# Patient Record
Sex: MALE | Race: BLACK OR AFRICAN AMERICAN | NOT HISPANIC OR LATINO | Employment: FULL TIME | URBAN - METROPOLITAN AREA
[De-identification: names, ages, dates, MRNs, and addresses within clinical notes are randomized per-mention and may not be internally consistent; named-entity substitution may affect disease eponyms.]

---

## 2019-09-12 DIAGNOSIS — M65.332 ACQUIRED TRIGGER FINGER OF LEFT MIDDLE FINGER: Primary | ICD-10-CM

## 2019-09-13 ENCOUNTER — CLINICAL SUPPORT (OUTPATIENT)
Dept: REHABILITATION | Facility: HOSPITAL | Age: 59
End: 2019-09-13
Payer: COMMERCIAL

## 2019-09-13 DIAGNOSIS — M25.649 DECREASED RANGE OF MOTION OF FINGER, UNSPECIFIED LATERALITY: ICD-10-CM

## 2019-09-13 PROCEDURE — 97165 OT EVAL LOW COMPLEX 30 MIN: CPT | Mod: PN

## 2019-09-13 PROCEDURE — 97110 THERAPEUTIC EXERCISES: CPT | Mod: PN

## 2019-09-13 NOTE — PLAN OF CARE
[facility] Occupational Therapy  Initial Evaluation     Date: 9/13/2019  Name: Inessa Peralta  Clinic Number: [id number]    Therapy Diagnosis:   1. Decreased range of motion of finger, unspecified laterality         Physician: Quita Cross MD    Physician Orders: Evaluate and Treat  Medical Diagnosis: M65.332 (ICD-10-CM) - Acquired trigger finger of left middle finger  Surgical Procedure and Date: 8/26/19  Evaluation Date: 9/13/19  Insurance Authorization Period Expiration: 09/11/2020  Plan of Care Certification Period: 9/13/19-12/5/19  Date of Return to MD: MASOOD    Visit # / Visits authorized: 1 / Pending  Time In:7:00 am  Time Out: 8:00 am  Total Billable Time: 60 minutes    Precautions:  Standard    Subjective     Involved Side: Left  Dominant Side: Left  Date of Onset: Patient reports surgery on   Mechanism of Injury:  Insidious   History of Current Condition: Mr. Peralta reports two injections without success along the left long finger 2' to triggering. He elected surgery on 9/2/19. Stitches were removed 9/10/19. He reports continued finger stiffness and arrives for therapy services.  Surgical Procedure: Trigger finger release 9/  Imaging: none   Previous Therapy: None    Patient's Goals for Therapy: To straighten finger    Pain:  Functional Pain Scale Rating 0-10:   none/10 on average  0/10 at best  none/10 at worst  Location: A1 pulley long finger left  Description: stiff  Aggravating Factors: Flexing  Easing Factors: ice    Occupation:    Working presently: employed  Duties: driving    Functional Limitations/Social History:    Previous functional status includes: Independent with all ADLs. I    Current FunctionalStatus   Home/Living environment : lives with their family      Limitation of Functional Status as follows:   ADLs/IADLs:     - Feeding: I    - Bathing: I    - Dressing/Grooming: I    - Driving: I     Leisure: Driving      Past Medical History/Physical  Systems Review:   Inessa Peralta  has no past medical history on file.    Inessa Peralta  has a past surgical history that includes Knee surgery.    [place] currently has no medications in their medication list.    Review of patient's allergies indicates:  No Known Allergies       Objective     Edema. Measured in centimeters.   9/13/2019      Left   Long:      P1 9.4 cm    PIP 9 cm    P2 8.7 cm    DIP WNL   P3 WNL           Hand ROM. Measured in degrees.  Date 9/13/2019      Left           Long:  MP 0/66              PIP -40/89              DIP 0/60            Strength (Dyanmometer) and Pinch Strength (Pinch Gauge)  Measured in pounds.  Date 9/13/2019      Left   Rung II 42         CMS Impairment/Limitation/Restriction for FOTO finger Survey    Therapist reviewed FOTO scores for Inessa Peralta on 9/13/2019.   FOTO documents entered into [employer] - see Media section.    Limitation Score: 30%  Category: Self Care         Treatment     Treatment Time In: 7:30 am  Treatment Time Out: 8:00 am   Total Treatment time separate from Evaluation time:30  [place] received therapeutic exercises to develop strength, endurance, ROM, flexibility, posture and core stabilization for 30 minutes including:  Five position tendon glide x 10   Pronation supination x 10  PROM into composite fist x 20 seconds x 4  Retrograde massage self x 5 min  Tenodesis wrist flexion/extensions + reverse tenodesis x 20 each  Intrinsic plus sponge compressions x 10  Palm w/b compressions x 10  Pt provided with recurve extension orthotic using scrap material to protmote static progressive finger extension      Home Exercise Program/Education:  Issued HEP (see patient instructions in EMR) and educated on modality use for pain management . Exercises were reviewed and [place] was able to demonstrate them prior to the end of the session.   Pt received a written copy of exercises to perform at home. [place] demonstrated good  understanding of the education  provided.  Pt was advised to perform these exercises free of pain, and to stop performing them if pain occurs.    Patient/Family Education: role of OT, goals for OT, scheduling/cancellations - pt verbalized understanding. Discussed insurance limitations with patient.    Additional Education provided: Pt instructed in use of finger extension splint    Assessment     Inessa Peralta is a 59 y.o. male referred to outpatient occupational therapy and presents with a medical diagnosis of finger extension splint, resulting in Decreased ROM, Decreased  strength, Decreased pinch strength, Increased pain, Edema and Joint Stiffness and demonstrates limitations as described in the chart below. Following medical record review it is determined that pt will benefit from occupational therapy services in order to maximize pain free and/or functional use of left trigger finger. The following goals were discussed with the patient and patient is in agreement with them as to be addressed in the treatment plan. The patient's rehab potential is Excellent.     Anticipated barriers to occupational therapy: None  Pt has no cultural, educational or language barriers to learning provided.    Profile and History Assessment of Occupational Performance Level of Clinical Decision Making Complexity Score   Occupational Profile:   Inessa Peralta is a 59 y.o. male who lives with their family and is currently employed. Inessa Peralta has difficulty with  feeding, bathing, grooming and dressing  finance management, driving/transportation management, phone/computer use, housework/household chores and medication management  affecting his/her daily functional abilities. His/her main goal for therapy is to straighten out finger.     Comorbidities:   none    Medical and Therapy History Review:   Brief               Performance Deficits    Physical:  Joint Mobility  Joint Stability  Muscle Power/Strength  Muscle Endurance  Skin Integrity/Scar  Formation  Edema   Strength  Visual Functions  Proprioception Functions  Tactile Functions  Muscle Tone  Postural Control  Pain    Cognitive:  No Deficits    Psychosocial:    No Deficits     Clinical Decision Making:  low    Assessment Process:  Problem-Focused Assessments    Modification/Need for Assistance:  Not Necessary    Intervention Selection:  Limited Treatment Options       low  Based on PMHX, co morbidities , data from assessments and functional level of assistance required with task and clinical presentation directly impacting function.       The following goals were discussed with the patient and patient is in agreement with them as to be addressed in the treatment plan.     Goals:     Goals to be met in 4 weeks: (10/10/19)  - Patient is independent with initial home exercise program to improve participation with ADL's.  - Pt to increase AROM of finger by 20 degrees extension to improve with patients ability to functional utilize hand for operating vehicle..  - Pt to increase  strength by 10 lbs to improve patients ability to operate machinery.  - Pt to decrease pain to less than or equal to 2/10 while performing all ADL's.  - Patient will be able to achieve less than or equal to 20% limitation on the FOTO, demonstrating overall improved functional ability with upper extremity.      Goals to be met by discharge:  - Patient is independent with advanced final home exercise program to improve participation with ADL's and IADL's.  - Pt to increase  strength to WNL to improve ability to carry heavy materials.  - Pt to increase finger extension to WNL as compared to RUE by d/c to improve patients ability to open tight jars  - Pt to report decrease in pain to less than or equal to 0/10 when performing ADL's.  - Patient will be able to achieve less than or equal to 10% limitation on the FOTO, demonstrating overall improved functional ability with upper extremity.         Plan   Certification  Period/Plan of care expiration: 9/13/2019 to 12/5/19.    Outpatient Occupational Therapy 2 times weekly for 12 weeks to include the following interventions: Paraffin, Fluidotherapy, Manual therapy/joint mobilizations, Modalities for pain management, US 3 mhz, Therapeutic exercises/activities., Iontophoresis with 2.0 cc Dexamethasone, Strengthening, Orthotic Fabrication/Fit/Training, Edema Control, Scar Management, Wound Care, Electrical Modalities, Joint Protection and Energy Conservation.      Lalo Kwong, OTR/L,CHT

## 2019-09-13 NOTE — PATIENT INSTRUCTIONS
"  TENDON GLIDES    Perform the following series of movements with your hand.     Start with an open palm and then bend your fingers to a claw hand as in the upper right image. Next, return to an open palm and then to an "L" hand" as shown in the bottom right image. Next, return to an open palm and then make a fist as in the bottom left image. Finally, return to an open palm and then repeat the series.     Wrist Tenodesis    Begin with your affected forearm supported on the edge of a table, desk, countertop, etc. Make sure to leave plenty of room for your wrist to move through the full available range of motion.    The first part of the motion is with the wrist relaxed in flexion and the digits relaxed in extension. Next, bring your wrist up into as much extension and your digits into as much of a fist as your pain/stiffness will allow. Repeat.        Sponge Intrinsic/Tripod Pinch    With the finger tips straight, pinch the sponge block.      Sponge Weight-bearing        prom for full flexion    using your other hand, bend all three of your knuckles into a fist as far as possible.  Hold for 10 seconds, repeat 5 times and repeat 3 times a day    Place affected palm on top of sponge, then push down bearing weight through hand.        Edema Control (Control of Swelling)- General Guidelines for the Hand    1) Keep affected hand elevated above heart level as much as possible.  2) Perform retrograde massage- apply hand lotion to elevated hand & massage the lotion from the finger tips down to the wrist. massage down only, for about 5 minutes, 3-5x per day  3) Raise hand over head & open/close fist 20 x every hour.  4) Apply ice 10-15 minutes 3x per day      Five position tendon glide x 10   Pronation supination x 10  PROM into composite fist x 20 seconds x 4  Retrograde massage self x 5 min  Tenodesis wrist flexion/extensions + reverse tenodesis x 20 each  Intrinsic plus sponge compressions x 10  Palm w/b compressions x " 10

## 2019-09-19 NOTE — PROGRESS NOTES
"  Occupational Therapy Daily Treatment Note     Date: 9/20/2019  Name: Inessa Peralta  Clinic Number: [id number]     Therapy Diagnosis:   1. Decreased range of motion of finger, unspecified laterality         Physician: Quita Cross MD     Physician Orders: Evaluate and Treat  Medical Diagnosis: M65.332 (ICD-10-CM) - Acquired trigger finger of left middle finger  Surgical Procedure and Date: 8/26/19  Evaluation Date: 9/13/19  Insurance Authorization Period Expiration: 09/11/2020  Plan of Care Certification Period: 9/13/19-12/5/19  Date of Return to MD: MASOOD     Visit # / Visits authorized: 2 / Pending  Time In: 7:40 am  Time Out: 8:20 am  Total Billable Time: 40 minutes     Precautions:  Standard      Subjective     Pt reports: "I leave the brace you gave me on a little longer and it helps it straighten out"   he was compliant with home exercise program given last session.   Response to previous treatment: Positive  Functional change: Pt improved ability to reach into pocket    Pain: 2/10  Location: left fingers      Objective     [place] received therapeutic exercises to develop strength, endurance, ROM, flexibility and posture for 22 minutes including:  Five position tendon glide x 10   Pronation supination x 10  Tenodesis wrist flexion/extensions + reverse tenodesis x 20 each  Intrinsic scissoring of orange putty x 2 min  Palm w/b compressions of orange putty   Expanding rubber band on cup x 20  Gross grasp medium orange putty x 3 min  Removal of hidden objects in medium putty x 2 min  Long finger extension through medium putty x 2 min    [place] received the following manual therapy techniques: applied to the: long finger for 10 minutes, including:  Gentle PROM stretching into extension  Gentle joint mobilizations   Scar mobilization using vibratory tool    [place] received the following direct contact modalities after being cleared for contraindications:Patient received ultrasound to base of long finger " and PIP joint of long finger area to increase blood flow, circulation, tissue elasticity, pain management and for wound/scar management for 8 minutes @ 3.3 Mhz, Intensity .08 w/cm2 at 100% duty cycle. .    [place] received the following supervised modalities after being cleared for contradictions: Patient received paraffin bath to left hand(s) for 5 minutes to increase blood flow, circulation, pain management and for tissue elasticity prior to therex.           Home Exercises and Education Provided     Education provided:   - Pt instructed in self stretching protocol  - Progress towards goals   -Pt provided with recurve extension orthotic using scrap material to protmote static progressive finger extension  Written Home Exercises Provided: Patient instructed to cont prior HEP.  Exercises were reviewed and [place] was able to demonstrate them prior to the end of the session.  [place] demonstrated good  understanding of the HEP provided.   .   See EMR under Patient Instructions for exercises provided prior visit.        Assessment     Mr. Peralta returns to therapy presenting with continued finger contracture of PIP joint of small finger. He appears to be responding very well to joint mobilization and therex. Patient has strength deficits which stands to benefit form continued therapy. Scar mobility remains poor.     [place] is progressing well towards his goals and there are no updates to goals at this time. Pt prognosis is Excellent.     Pt will continue to benefit from skilled outpatient occupational therapy to address the deficits listed in the problem list on initial evaluation provide pt/family education and to maximize pt's level of independence in the home and community environment.     Anticipated barriers to occupational therapy: None    Pt's spiritual, cultural and educational needs considered and pt agreeable to plan of care and goals.    Goals:  Goals to be met in 4 weeks: (10/10/19)  - Patient is  independent with initial home exercise program to improve participation with ADL's. NOT MET   - Pt to increase AROM of finger by 20 degrees extension to improve with patients ability to functional utilize hand for operating vehicle. NOT MET   - Pt to increase  strength by 10 lbs to improve patients ability to operate machinery. NOT MET   - Pt to decrease pain to less than or equal to 2/10 while performing all ADL's. NOT MET   - Patient will be able to achieve less than or equal to 20% limitation on the FOTO, demonstrating overall improved functional ability with upper extremity. NOT MET      Goals to be met by discharge:  - Patient is independent with advanced final home exercise program to improve participation with ADL's and IADL's. NOT MET   - Pt to increase  strength to WNL to improve ability to carry heavy materials. NOT MET   - Pt to increase finger extension to WNL as compared to RUE by d/c to improve patients ability to open tight jars  - Pt to report decrease in pain to less than or equal to 0/10 when performing ADL's. NOT MET   - Patient will be able to achieve less than or equal to 10% limitation on the FOTO, demonstrating overall improved functional ability with upper extremity. NOT MET       Plan   Continue with occupational therapy POC to progress patient to improved participation with ADL's and IADL's.   Updates/Grading for next session: Continue to progress ROM, Strength, pain and restore patient to PLOF with ADL's and IADL's.      Lalo Kwong, OTR/L,CHT

## 2019-09-20 ENCOUNTER — CLINICAL SUPPORT (OUTPATIENT)
Dept: REHABILITATION | Facility: HOSPITAL | Age: 59
End: 2019-09-20
Payer: COMMERCIAL

## 2019-09-20 DIAGNOSIS — M65.332 ACQUIRED TRIGGER FINGER OF LEFT MIDDLE FINGER: Primary | ICD-10-CM

## 2019-09-20 DIAGNOSIS — M25.649 DECREASED RANGE OF MOTION OF FINGER, UNSPECIFIED LATERALITY: ICD-10-CM

## 2019-09-20 PROCEDURE — 97140 MANUAL THERAPY 1/> REGIONS: CPT | Mod: PN

## 2019-09-20 PROCEDURE — 97018 PARAFFIN BATH THERAPY: CPT | Mod: PN,59

## 2019-09-20 PROCEDURE — 97110 THERAPEUTIC EXERCISES: CPT | Mod: PN

## 2019-09-20 PROCEDURE — 97035 APP MDLTY 1+ULTRASOUND EA 15: CPT | Mod: PN

## 2019-09-26 NOTE — PROGRESS NOTES
"  Occupational Therapy Daily Treatment Note     Date: 9/27/2019  Name: Inessa Peralta  [facility] Number: [id number]     Therapy Diagnosis:   1. Decreased range of motion of finger, unspecified laterality         Physician: Quita Cross MD     Physician Orders: Evaluate and Treat  Medical Diagnosis: M65.332 (ICD-10-CM) - Acquired trigger finger of left middle finger  Surgical Procedure and Date: 8/26/19  Evaluation Date: 9/13/19  Insurance Authorization Period Expiration: 09/11/2020  Plan of Care Certification Period: 9/13/19-12/5/19  Date of Return to MD: MASOOD     Visit # / Visits authorized: 2 / Pending  Time In: 3:30 pm  Time Out: 4:00:pm  Total Billable Time: 40 minutes     Precautions:  Standard      Subjective     Pt reports: "It gets straight then when I wake up its bend again"   he was compliant with home exercise program given last session.   Response to previous treatment: Positive  Functional change: Pt improved ability to reach into pocket    Pain: 2/10  Location: left fingers      Objective     [place] received therapeutic exercises to develop strength, endurance, ROM, flexibility and posture for 22 minutes including:  Five position tendon glide x 10   Pronation supination x 10  Tenodesis wrist flexion/extensions + reverse tenodesis x 20 each  Intrinsic scissoring of orange putty x 2 min  Palm w/b compressions of orange putty   Expanding rubber band on cup x 20  Gross grasp medium orange putty x 3 min  Removal of hidden objects in medium putty x 2 min  Long finger extension through medium putty x 2 min  Finger expansion of orange putty x 2 min  Gross grasp x 2 min orange putty   [place] received the following manual therapy techniques: applied to the: long finger for 10 minutes, including:  Gentle PROM stretching into extension  Gentle joint mobilizations   Scar mobilization using vibratory tool    [place] received the following direct contact modalities after being cleared for " contraindications:Patient received ultrasound to base of long finger and PIP joint of long finger area to increase blood flow, circulation, tissue elasticity, pain management and for wound/scar management for 8 minutes @ 3.3 Mhz, Intensity .08 w/cm2 at 100% duty cycle. .    [place] received the following supervised modalities after being cleared for contradictions: Patient received paraffin bath to left hand(s) for 5 minutes to increase blood flow, circulation, pain management and for tissue elasticity prior to therex.           Home Exercises and Education Provided     Education provided:   - Pt instructed in self stretching protocol  - Progress towards goals   -Pt provided with recurve extension orthotic using scrap material to protmote static progressive finger extension  Written Home Exercises Provided: Patient instructed to cont prior HEP.  Exercises were reviewed and [place] was able to demonstrate them prior to the end of the session.  [place] demonstrated good  understanding of the HEP provided.   .   See EMR under Patient Instructions for exercises provided prior visit.        Assessment     Mr. Peralta continues to have a joint stiffness.  strength shows improvement. Pt provided with yellow theraputty and coban wrapping to address continued edema. Mr. Peralta remains compliant with self PROM program and continues to benefit from therapy at this time.     [place] is progressing well towards his goals and there are no updates to goals at this time. Pt prognosis is Excellent.     Pt will continue to benefit from skilled outpatient occupational therapy to address the deficits listed in the problem list on initial evaluation provide pt/family education and to maximize pt's level of independence in the home and community environment.     Anticipated barriers to occupational therapy: None    Pt's spiritual, cultural and educational needs considered and pt agreeable to plan of care and goals.    Goals:  Goals  to be met in 4 weeks: (10/10/19)  - Patient is independent with initial home exercise program to improve participation with ADL's. NOT MET   - Pt to increase AROM of finger by 20 degrees extension to improve with patients ability to functional utilize hand for operating vehicle. NOT MET   - Pt to increase  strength by 10 lbs to improve patients ability to operate machinery. NOT MET   - Pt to decrease pain to less than or equal to 2/10 while performing all ADL's. NOT MET   - Patient will be able to achieve less than or equal to 20% limitation on the FOTO, demonstrating overall improved functional ability with upper extremity. NOT MET      Goals to be met by discharge:  - Patient is independent with advanced final home exercise program to improve participation with ADL's and IADL's. NOT MET   - Pt to increase  strength to WNL to improve ability to carry heavy materials. NOT MET   - Pt to increase finger extension to WNL as compared to RUE by d/c to improve patients ability to open tight jars  - Pt to report decrease in pain to less than or equal to 0/10 when performing ADL's. NOT MET   - Patient will be able to achieve less than or equal to 10% limitation on the FOTO, demonstrating overall improved functional ability with upper extremity. NOT MET       Plan   Continue with occupational therapy POC to progress patient to improved participation with ADL's and IADL's.   Updates/Grading for next session: Continue to progress ROM, Strength, pain and restore patient to PLOF with ADL's and IADL's.      Lalo Kwong, OTR/L,CHT

## 2019-09-27 ENCOUNTER — CLINICAL SUPPORT (OUTPATIENT)
Dept: REHABILITATION | Facility: HOSPITAL | Age: 59
End: 2019-09-27
Payer: COMMERCIAL

## 2019-09-27 DIAGNOSIS — M25.649 DECREASED RANGE OF MOTION OF FINGER, UNSPECIFIED LATERALITY: ICD-10-CM

## 2019-09-27 PROCEDURE — 97110 THERAPEUTIC EXERCISES: CPT | Mod: PN

## 2019-09-27 PROCEDURE — 97140 MANUAL THERAPY 1/> REGIONS: CPT | Mod: PN

## 2019-09-27 PROCEDURE — 97035 APP MDLTY 1+ULTRASOUND EA 15: CPT | Mod: PN

## 2019-10-11 ENCOUNTER — CLINICAL SUPPORT (OUTPATIENT)
Dept: REHABILITATION | Facility: HOSPITAL | Age: 59
End: 2019-10-11
Payer: COMMERCIAL

## 2019-10-11 DIAGNOSIS — M25.642 DECREASED RANGE OF MOTION OF FINGER OF LEFT HAND: ICD-10-CM

## 2019-10-11 PROCEDURE — 97110 THERAPEUTIC EXERCISES: CPT | Mod: PN

## 2019-10-11 PROCEDURE — 97018 PARAFFIN BATH THERAPY: CPT | Mod: PN

## 2019-10-11 NOTE — PATIENT INSTRUCTIONS
Edema Reduction (Contrast Baths)        Have 2 containers deep enough for involved area to be immersed. Fill one with warm water and the other with slightly chilled water.  Soak in warm water for 2 minutes; cold for  1 minute.  Alternate and continue for 9 minutes. End in warm water.    Copyright © I. All rights reserved.   PIP Extension (Passive        Use thumb of other hand on top of joint and two fingers under- neath on either side to straighten middle joint of long finger. Hold __10__ seconds.  Then bend finger hold x 5 seconds and repeat  Repeat _10___ times. Do __3__ sessions per day.    Copyright © [employer]. All rights reserved.

## 2019-10-11 NOTE — PROGRESS NOTES
"  Occupational Therapy Daily Treatment Note     Date: 10/11/2019  Name: Inessa Peralta  [facility] Number: [id number]     Therapy Diagnosis:   1. Decreased range of motion of finger of left hand       Physician: Quita Cross MD     Physician Orders: Evaluate and Treat  Medical Diagnosis: M65.332 (ICD-10-CM) - Acquired trigger finger of left middle finger  Surgical Procedure and Date: 8/26/19  Evaluation Date: 9/13/19  Insurance Authorization Period Expiration: 09/11/2020  Plan of Care Certification Period: 9/13/19-12/5/19  Date of Return to MD: MASOOD     Visit # / Visits authorized: 4 / 10  Time In: 7:45 Am  Time Out: 8:25  Am  Total Billable Time:  40 minutes     Precautions:  Standard      Subjective     Pt reports: "It just won't get straight."   he was compliant with home exercise program given last session.   Response to previous treatment: Positive  Functional change: Pt improved ability to reach into pocket    Pain:  0/10 at rest 5-6/10 with fist   Location: left fingers      Objective     Re-Assess:    Edema. Measured in centimeters.    9/13/2019    10/11/10     Left Left    Long:        P1 9.4 cm 8.7    PIP 9 cm  8.8   P2 8.7 cm 8.4    DIP WNL    P3 WNL       Hand ROM. Measured in degrees.  Date 9/13/2019    10/11/19     Left left   Long:  MP 0/66 0/73              PIP -40/89 -47/86              DIP 0/60 0/58   Post therapy PIP extension 38*      Strength (Dyanmometer) and Pinch Strength (Pinch Gauge)  Measured in pounds.  Date 9/13/2019    10/11/19     Left Left    Rung II 42 52     [place] received the following direct contact modalities after being cleared for contraindications:    Patient received paraffin bath to left hand(s) for 5 minutes to increase blood flow, circulation, pain management and for tissue elasticity prior to therex.     [place] received therapeutic exercises to develop strength, endurance, ROM, flexibility and posture for 35 minutes including:  -left long finger PIP joint " mobilization for extended time  -passive stretch of long finger into Flexion and extension  -isolated active flexion and extension     Home Exercises and Education Provided     Education provided:   - additional  Stretching and contrast bath instructions provided.  -pt provided with coban to apply his finger splint   -pt provided with digi sleeve to assist with swelling of his long finger  - Progress towards goals   -Pt provided with recurve extension orthotic using scrap material to protmote static progressive finger extension  Written Home Exercises Provided: Patient instructed to cont prior HEP.  Exercises were reviewed and [place] was able to demonstrate them prior to the end of the session.  [place] demonstrated good  understanding of the HEP provided.   .   See EMR under Patient Instructions for exercises provided prior visit.        Assessment     Mr. Peralta continues to have a joint stiffness, improvement in edema noted above although decreases in AROM noted as well  Pt demonstrated improvement in finger extension of PIP upon conclusion of therapy.  [place] is progressing well towards his goals and there are no updates to goals at this time. Pt prognosis is Excellent.     Pt will continue to benefit from skilled outpatient occupational therapy to address the deficits listed in the problem list on initial evaluation provide pt/family education and to maximize pt's level of independence in the home and community environment.     Anticipated barriers to occupational therapy: None    Pt's spiritual, cultural and educational needs considered and pt agreeable to plan of care and goals.    Goals:  Goals to be met in 4 weeks: (10/10/19)  - Patient is independent with initial home exercise program to improve participation with ADL's. In progress-  - Pt to increase AROM of finger by 20 degrees extension to improve with patients ability to functional utilize hand for operating vehicle. NOT MET   - Pt to increase   strength by 10 lbs to improve patients ability to operate machinery.met 10/11/19  - Pt to decrease pain to less than or equal to 2/10 while performing all ADL's. NOT MET   - Patient will be able to achieve less than or equal to 20% limitation on the FOTO, demonstrating overall improved functional ability with upper extremity. Not assessed      Goals to be met by discharge:  - Patient is independent with advanced final home exercise program to improve participation with ADL's and IADL's. NOT MET   - Pt to increase  strength to WNL to improve ability to carry heavy materials. NOT MET   - Pt to increase finger extension to WNL as compared to RUE by d/c to improve patients ability to open tight jars  - Pt to report decrease in pain to less than or equal to 0/10 when performing ADL's. NOT MET   - Patient will be able to achieve less than or equal to 10% limitation on the FOTO, demonstrating overall improved functional ability with upper extremity. NOT MET       Plan   Continue with occupational therapy POC to progress patient to improved participation with ADL's and IADL's.   Updates/Grading for next session: Continue to progress ROM, Strength, pain and restore patient to PLOF with ADL's and IADL's.      JOSH Lubin

## 2019-10-17 ENCOUNTER — CLINICAL SUPPORT (OUTPATIENT)
Dept: REHABILITATION | Facility: HOSPITAL | Age: 59
End: 2019-10-17
Payer: COMMERCIAL

## 2019-10-17 DIAGNOSIS — M25.642 DECREASED RANGE OF MOTION OF FINGER OF LEFT HAND: ICD-10-CM

## 2019-10-17 PROCEDURE — 97110 THERAPEUTIC EXERCISES: CPT | Mod: PN

## 2019-10-17 NOTE — PROGRESS NOTES
"  Occupational Therapy Daily Treatment Note     Date: 10/17/2019  Name: Inessa Peralta  [facility] Number: [id number]     Therapy Diagnosis:   1. Decreased range of motion of finger of left hand         Physician: Quita Cross MD     Physician Orders: Evaluate and Treat  Medical Diagnosis: M65.332 (ICD-10-CM) - Acquired trigger finger of left middle finger  Surgical Procedure and Date: 8/26/19  Evaluation Date: 9/13/19  Insurance Authorization Period Expiration: 09/11/2020  Plan of Care Certification Period: 9/13/19-12/5/19  Date of Return to MD: MASOOD     Visit # / Visits authorized: 5 / 10  Time In: 11:00 Am  Time Out: 11:30  Am  Total Billable Time:  40 minutes     Precautions:  Standard      Subjective     Pt reports: "It felt better with the things that Mrs. Rodriguez did. I want to keep doing that. I can only keep my brace on for 5 minutes" Pt cautioned against over tightening static extension splint and he should be able to comfortably elvira. For 15-20 minutes   he was compliant with home exercise program given last session.   Response to previous treatment: Positive  Functional change: Pt improved ability to reach into pocket    Pain:  0/10 at rest 5-6/10 with fist   Location: left fingers      Objective       [place] received the following direct contact modalities after being cleared for contraindications:    Patient received paraffin bath to left hand(s) for 5 minutes to increase blood flow, circulation, pain management and for tissue elasticity prior to therex.     [place] received therapeutic exercises to develop strength, endurance, ROM, flexibility and posture for 30 minutes including:  -left long finger PIP joint mobilization for extended time  -passive stretch of long finger into Flexion and extension  -isolated active flexion and extension     Home Exercises and Education Provided     Education provided:   - additional  Stretching and contrast bath instructions provided.  -pt provided with coban to apply " his finger splint   -pt provided with digi sleeve to assist with swelling of his long finger  - Progress towards goals   -Pt provided with recurve extension orthotic using scrap material to protmote static progressive finger extension  Written Home Exercises Provided: Patient instructed to cont prior HEP.  Exercises were reviewed and [place] was able to demonstrate them prior to the end of the session.  [place] demonstrated good  understanding of the HEP provided.   .   See EMR under Patient Instructions for exercises provided prior visit.        Assessment     Treatment strategy adjusted to parallel vist on 10/11/19. Patient arrived with -30 degree finger extension and was able to achieve -19 following session. He reports satisfaction with low long progressive stretching.     [place] is progressing well towards his goals and there are no updates to goals at this time. Pt prognosis is Excellent.     Pt will continue to benefit from skilled outpatient occupational therapy to address the deficits listed in the problem list on initial evaluation provide pt/family education and to maximize pt's level of independence in the home and community environment.     Anticipated barriers to occupational therapy: None    Pt's spiritual, cultural and educational needs considered and pt agreeable to plan of care and goals.    Goals:  Goals to be met in 4 weeks: (10/10/19)  - Patient is independent with initial home exercise program to improve participation with ADL's. In progress-  - Pt to increase AROM of finger by 20 degrees extension to improve with patients ability to functional utilize hand for operating vehicle. NOT MET   - Pt to increase  strength by 10 lbs to improve patients ability to operate machinery.met 10/11/19  - Pt to decrease pain to less than or equal to 2/10 while performing all ADL's. NOT MET   - Patient will be able to achieve less than or equal to 20% limitation on the FOTO, demonstrating overall  improved functional ability with upper extremity. Not assessed      Goals to be met by discharge:  - Patient is independent with advanced final home exercise program to improve participation with ADL's and IADL's. NOT MET   - Pt to increase  strength to WNL to improve ability to carry heavy materials. NOT MET   - Pt to increase finger extension to WNL as compared to RUE by d/c to improve patients ability to open tight jars  - Pt to report decrease in pain to less than or equal to 0/10 when performing ADL's. NOT MET   - Patient will be able to achieve less than or equal to 10% limitation on the FOTO, demonstrating overall improved functional ability with upper extremity. NOT MET       Plan   Continue with occupational therapy POC to progress patient to improved participation with ADL's and IADL's.   Updates/Grading for next session: Continue to progress ROM, Strength, pain and restore patient to PLOF with ADL's and IADL's.      Lalo Kwong, OTR/L,CHT , LOTR

## 2019-10-18 DIAGNOSIS — M24.542 FLEXION CONTRACTURE OF JOINT OF LEFT HAND: Primary | ICD-10-CM

## 2019-10-24 NOTE — PROGRESS NOTES
Occupational Therapy Daily Treatment Note     Date: 10/25/2019  Name: Inessa Peralta  Clinic Number: [id number]     Therapy Diagnosis:   1. Decreased range of motion of finger of left hand         Physician: Quita Cross MD     Physician Orders: Evaluate and Treat  Medical Diagnosis: M65.332 (ICD-10-CM) - Acquired trigger finger of left middle finger  Surgical Procedure and Date: 8/26/19  Evaluation Date: 9/13/19  Insurance Authorization Period Expiration: 09/11/2020  Plan of Care Certification Period: 9/13/19-12/5/19  Date of Return to MD: MASOOD     Visit # / Visits authorized: 6 / 10  Time In: 8:30 am  Time Out:  9:05  m  Total Billable Time:  35 minutes     Precautions:  Standard      Subjective     Pt reports:  It really is not feeling any better.   he was compliant with home exercise program given last session.   Response to previous treatment: sore  Functional change: none reported    Pain:  0/10 at rest 7/10 with fist   Location: left fingers      Objective       [place] received the following direct contact modalities after being cleared for contraindications:    Patient received fluidotherapy to left hand(s) for 8 minutes to increase blood flow, circulation, desensitization, sensory re-education and for pain management.    [place] received therapeutic exercises to develop strength, endurance, ROM, flexibility and posture for 27 minutes including:  -left long finger PIP joint mobilization for extended time  -passive stretch of long finger into extension and to lesser extent flexion   -yellow rubber band resisted long finger extension x 2 min  -extension of left long finger from table top x 10 reps  -abd/adduction of long finger x 10 reps   -isolated active flexion and extension     Left long finger PIP ext 27 flex 97 dip flex 37    Home Exercises and Education Provided     Education provided:   -- Progress towards goals   -pt instructed to stretch and flex every hour to assist in his  recovery.    Written Home Exercises Provided: Patient instructed to cont prior HEP.  Exercises were reviewed and [place] was able to demonstrate them prior to the end of the session.  [place] demonstrated good  understanding of the HEP provided.   .   See EMR under Patient Instructions for exercises provided 10/11/19 and 9/13/19       Assessment     Treatment strategy adjusted to parallel vist on 10/11/19. Pt with improvement in AROM upon conclusion of therapy session.      [place] is progressing well towards his goals and there are no updates to goals at this time. Pt prognosis is Excellent.     Pt will continue to benefit from skilled outpatient occupational therapy to address the deficits listed in the problem list on initial evaluation provide pt/family education and to maximize pt's level of independence in the home and community environment.     Anticipated barriers to occupational therapy: None    Pt's spiritual, cultural and educational needs considered and pt agreeable to plan of care and goals.    Goals:  Goals to be met in 4 weeks: (10/10/19)  - Patient is independent with initial home exercise program to improve participation with ADL's. In progress-  - Pt to increase AROM of finger by 20 degrees extension to improve with patients ability to functional utilize hand for operating vehicle. NOT MET   - Pt to increase  strength by 10 lbs to improve patients ability to operate machinery.met 10/11/19  - Pt to decrease pain to less than or equal to 2/10 while performing all ADL's. NOT MET   - Patient will be able to achieve less than or equal to 20% limitation on the FOTO, demonstrating overall improved functional ability with upper extremity. Not assessed      Goals to be met by discharge:  - Patient is independent with advanced final home exercise program to improve participation with ADL's and IADL's. NOT MET   - Pt to increase  strength to WNL to improve ability to carry heavy materials. NOT MET    - Pt to increase finger extension to WNL as compared to RUE by d/c to improve patients ability to open tight jars  - Pt to report decrease in pain to less than or equal to 0/10 when performing ADL's. NOT MET   - Patient will be able to achieve less than or equal to 10% limitation on the FOTO, demonstrating overall improved functional ability with upper extremity. NOT MET       Plan   Continue with occupational therapy POC to progress patient to improved participation with ADL's and IADL's.   Updates/Grading for next session: Continue to progress ROM, Strength, pain and restore patient to PLOF with ADL's and IADL's.      JOSH Lubin

## 2019-10-25 ENCOUNTER — CLINICAL SUPPORT (OUTPATIENT)
Dept: REHABILITATION | Facility: HOSPITAL | Age: 59
End: 2019-10-25
Payer: COMMERCIAL

## 2019-10-25 DIAGNOSIS — M25.642 DECREASED RANGE OF MOTION OF FINGER OF LEFT HAND: ICD-10-CM

## 2019-10-25 PROCEDURE — 97022 WHIRLPOOL THERAPY: CPT | Mod: PN

## 2019-10-25 PROCEDURE — 97110 THERAPEUTIC EXERCISES: CPT | Mod: PN

## 2019-12-06 DIAGNOSIS — M65.332 ACQUIRED TRIGGER FINGER OF LEFT MIDDLE FINGER: Primary | ICD-10-CM

## 2019-12-06 DIAGNOSIS — M24.542 FLEXION CONTRACTURE OF JOINT OF LEFT HAND: ICD-10-CM

## 2019-12-20 ENCOUNTER — CLINICAL SUPPORT (OUTPATIENT)
Dept: REHABILITATION | Facility: HOSPITAL | Age: 59
End: 2019-12-20
Payer: COMMERCIAL

## 2019-12-20 DIAGNOSIS — M25.642 FINGER STIFFNESS, LEFT: ICD-10-CM

## 2019-12-20 PROCEDURE — 97165 OT EVAL LOW COMPLEX 30 MIN: CPT | Mod: PO

## 2019-12-20 PROCEDURE — L3933 FO W/O JOINTS CF: HCPCS | Mod: PO

## 2019-12-20 NOTE — PLAN OF CARE
[facility] Occupational Therapy  Initial Evaluation     Date: 12/20/2019  Patient: Inesas Peralta  Chart Number: [id number]  Referring Physician: Quita Cross MD  Therapy Diagnosis:   1. Finger stiffness, left         Medical Diagnosis: Left LF PIP contracture  Physician Orders: Eval/tx, splinting  Evaluation Date: 12/20/2019  Authorization Period: 12/31/2019  Surgery Date and Procedure: 11/26/2019  Date of Return to MD: TBE    Visit #: 1 of 20  Time In: 4:00 PM  Time Out: 4:50 PM    Precautions: Standard     Subjective     Involved Side: Left  Dominant Side: Right  Date of Onset: 11/26/2019  History of Current Condition: Pt s/p left LF PIP capsulotomy 2/2 failed trigger finger release and subsequent PIP contracture.    Patient's Goals for Therapy: Use left hand for normal work duties.    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    Inessa Peralta  has a past surgical history that includes Knee surgery.    [place] currently has no medications in their medication list.    Review of patient's allergies indicates:  No Known Allergies       Objective     Mental status: alert    Observation:   Skin intact    Sensation:   WNL      Wound Assessment:   Closed    Edema: Circumferential measurements: In centimters     12/20/2019 12/20/2019    Left Right   Index:       P1      PIP     P2      DIP     P3     Long:       P1 9.0 8.0    PIP 9.4 8.7   P2 8.7 7.5    DIP     P3     Ring:       P1                 PIP                P2                  DIP     P3     Small:        P1                 PIP            P2             DIP     P3     Thumb:     Prox. Phalanx      IP     Distal Phalanx       Range of Motion:   Left    Finger AROM   LONG    MP  +17/70   PIP  40/90   DIP  0/45        Strength: (DHRUV Dynamometer in psi.)      12/20/2019 12/20/2019    Left Right   Rung II 50 111       Pinch Strength (Measured in psi)     12/20/2019 12/20/2019    Left Right   3pt Pinch 13  21        Treatment     [place] received the following orthotic fabrication:   -Finger gutter     Home Exercise Program/Education:  Issued HEP (see patient instructions in EMR) and educated on modality use for pain management . Exercises were reviewed and [place] was able to demonstrate them prior to the end of the session.   Pt received a written copy of exercises to perform at home. [place] demonstrated good  understanding of the education provided.  Pt was advised to perform these exercises free of pain, and to stop performing them if pain occurs.    Patient/Family Education: role of OT, goals for OT, scheduling/cancellations - pt verbalized understanding. Discussed insurance limitations with patient.    Additional Education provided: Wear/care schedule, retrograde massage      Assessment     Inessa Peralta is a 59 y.o. male presents with limitations as described in problem list. Patient can benefit from Occupational Therapy services for Iontophoresis, ultrasound, moist heat, therapeutic exercises, home exercise program provied with written instructions, ice and strengthening and orthotics, if deemed necessary . The following goals were discussed with the patient and she is in agreement with them as to be addressed in the treatment plan.    The patient's rehab potential is Good.     Anticipated barriers to occupational therapy: Long-standing nature of contracture  Pt has no cultural, educational or language barriers to learning provided.    Profile and History Assessment of Occupational Performance Level of Clinical Decision Making Complexity Score   Occupational Profile:   Inessa Peralta is a  59 y.o. male who is currently employed [place] [employer] has difficulty with  ADLs and IADLs as listed previously, which  affecting his/her daily functional abilities.      Comorbidities:    has no past medical history on file.    Medical and Therapy History Review:   Brief               Performance Deficits    Physical:  Joint Mobility  Muscle Power/Strength   Strength  Pinch Strength  Gross Motor Coordination  Fine Motor Coordination  Pain    Cognitive:  No Deficits    Psychosocial:    No Deficits     Clinical Decision Making:  low    Assessment Process:  Problem-Focused Assessments    Modification/Need for Assistance:  Not Necessary    Intervention Selection:  Multiple Treatment Options       low  Based on PMHX, co morbidities , data from assessments and functional level of assistance required with task and clinical presentation directly impacting function.         Goals:    LTG's (8 weeks):  1)   Increase ROM 25 degrees in Middle finger PIP joint dorsal aspect to increase functional hand use for preparation to grasp large objects.  2)   Increase  strength 50 lbs. to grasp chain.  3)   Increase 3J pinch 6 psis for grasping boot straps.  4)   Decrease complaints of pain to  2 out of 10 at worst to increase functional hand use for ADL/work/leisure activities.  5)   Pt will return to near to prior level of function for ADLs and household management reporting I or Mod I with ADLs (dressing, feeding, grooming, toileting).     STG's (4 weeks)  1)   Patient to be IND with HEP and modalities for pain/edema managment.  2)   Increase PIP flex ROM 10 degrees to increase functional hand use for ADLs/work/leisure activities.  3)   Increase  strength 20 lbs. to improve functional grasp for ADLs/work/leisure activities.   4)   Increase 3J pinch 3 psi's to increase independence with button and FM Coordination.   5)   Patient to be IND wiht Orthotic use, wear and care precautions.   6)   Decrease complaints of pain to   4 out of 10 at worst to increase functional hand use for ADL/work/leisure activities.          Plan     Pt to be treated by Occupational Therapy 2 times per week for 8 weeks to achieve the established goals.     Treatment to include: Paraffin, Fluidotherapy, Manual therapy/joint mobilizations, Therapeutic exercises/activities., Strengthening, Orthotic Fabrication/Fit/Training, Edema Control and Scar Management, as well as any other treatments deemed necessary based on the patient's needs or progress.     LEIDA Posey  OTR/L, CHT  Occupational therapist, Certified Hand Therapist

## 2020-01-22 ENCOUNTER — CLINICAL SUPPORT (OUTPATIENT)
Dept: REHABILITATION | Facility: HOSPITAL | Age: 60
End: 2020-01-22
Payer: COMMERCIAL

## 2020-01-22 DIAGNOSIS — M25.642 FINGER STIFFNESS, LEFT: ICD-10-CM

## 2020-01-22 PROCEDURE — 97110 THERAPEUTIC EXERCISES: CPT | Mod: PO

## 2020-01-22 PROCEDURE — 97018 PARAFFIN BATH THERAPY: CPT | Mod: PO

## 2020-01-22 NOTE — PATIENT INSTRUCTIONS
Strengthening (Resistive Putty)        Squeeze putty using thumb and all fingers.  Repeat 10 times. Do 3 sessions per day.    Copyright © I. All rights reserved.     Flexion (Resistive Putty)        Keeping fingertips straight, press putty towards base of palm.  Repeat 10 times. Do 3 sessions per day.    Copyright © I. All rights reserved.     Extension (Assistive Putty)        Roll putty back and forth, being sure to use all fingertips.  Repeat 10 times. Do 3 sessions per day.    Copyright © I. All rights reserved.     Finger / Thumb Extensors        Place right fingers and thumb in center of putty donut, stretch out.  Repeat 10 times. Do 3 sessions per day.    Copyright © I. All rights reserved.     Extension (Resistive Putty)        Straighten thumb inside putty loop anchored by fingers.  Repeat 10 times. Do 3 sessions per day.    Copyright © I. All rights reserved.     Adduction (Resistive Putty)        Press between 2 fingers and pull putty anchored with other hand. Repeat with all fingers.  Repeat 10 times. Do 3 sessions per day.    Copyright © I. All rights reserved.     Palmar Pinch Strengthening (Resistive Putty)        Pinch putty between thumb and each fingertip in turn.  Repeat 10 times. Do 3 sessions per day.    Copyright © I. All rights reserved.

## 2020-01-22 NOTE — PROGRESS NOTES
Occupational Therapy Daily Treatment Note     Date: 1/22/2020  Name: Inessa Peralta  [facility] Number: [id number]    Therapy Diagnosis:   Encounter Diagnosis   Name Primary?    Finger stiffness, left      Physician: Quita Cross MD       Medical Diagnosis: Left LF PIP contracture  Physician Orders: Eval/tx, splinting  Evaluation Date: 12/20/2019  Authorization Period: 12/31/2019  Surgery Date and Procedure: 11/26/2019  Date of Return to MD: MALATHI     Visit #: 2 of 20  Time In: 9:00 AM  Time Out: 10:00 AM     Precautions: Standard     Subjective     Pt reports: Pt unable/unwilling to articulate why he missed his scheduled viists.  Response to previous treatment:Cooper well.    Pain: 3/10  Location: left fingers      Objective     [place] received the following supervised modalities after being cleared for contradictions for 15 minutes:   -Paraffin w/ LMB    [place] received therapeutic exercises for 40 minutes including:  -Waves, hooks, comp fists, lifts, blocked PIP ext 20  -Isospheres 3'  -Coins 1 container  -Pink putty molding 3', squeezes 20, LF pinches 3 logs  -PROM PIP ext 10 count x 10    Range of Motion:   Left     Finger AROM    LONG     MP  +15/70 -2/=   PIP  41/95 -1/+5   DIP  0/40 =/-5          Strength: (DHRUV Dynamometer in psi.)      Rung II 75 +25         Pinch Strength (Measured in psi)     3pt Pinch 137 +4            Home Exercises and Education Provided     Education provided:   - Upgraded HEP, issued LMB w/ 30 min on 60 min off wearing throughout the day,   - Progress towards goals     Written Home Exercises Provided: yes.  Exercises were reviewed and [place] was able to demonstrate them prior to the end of the session.  [place] demonstrated good  understanding of the HEP provided.   .   See EMR under Patient Instructions for exercises provided prior visit.        Assessment     Pt would continue to benefit from skilled OT to maximize LUE function.     [place] is progressing well towards  his goals and there are no updates to goals at this time. Pt prognosis is Good.     Pt will continue to benefit from skilled outpatient occupational therapy to address the deficits listed in the problem list on initial evaluation provide pt/family education and to maximize pt's level of independence in the home and community environment.       Pt's spiritual, cultural and educational needs considered and pt agreeable to plan of care and goals.    Goals:   LTG's (8 weeks):  1)   Increase ROM 25 degrees in Middle finger PIP joint dorsal aspect to increase functional hand use for preparation to grasp large objects.  2)   Increase  strength 50 lbs. to grasp chain.  3)   Increase 3J pinch 6 psis for grasping boot straps.  4)   Decrease complaints of pain to  2 out of 10 at worst to increase functional hand use for ADL/work/leisure activities.  5)   Pt will return to near to prior level of function for ADLs and household management reporting I or Mod I with ADLs (dressing, feeding, grooming, toileting).      STG's (4 weeks)  1)   Patient to be IND with HEP and modalities for pain/edema managment.  2)   Increase PIP flex ROM 10 degrees to increase functional hand use for ADLs/work/leisure activities.  3)   Increase  strength 20 lbs. to improve functional grasp for ADLs/work/leisure activities.   4)   Increase 3J pinch 3 psi's to increase independence with button and FM Coordination.   5)   Patient to be IND wiht Orthotic use, wear and care precautions.   6)   Decrease complaints of pain to  4 out of 10 at worst to increase functional hand use for ADL/work/leisure activities.    Plan   Cont OT to address above goals.        LEIDA Posey OTR/L, CHT

## 2020-01-31 ENCOUNTER — CLINICAL SUPPORT (OUTPATIENT)
Dept: REHABILITATION | Facility: HOSPITAL | Age: 60
End: 2020-01-31
Payer: COMMERCIAL

## 2020-01-31 DIAGNOSIS — M25.642 FINGER STIFFNESS, LEFT: ICD-10-CM

## 2020-01-31 PROCEDURE — 97110 THERAPEUTIC EXERCISES: CPT | Mod: PO

## 2020-01-31 PROCEDURE — 97018 PARAFFIN BATH THERAPY: CPT | Mod: PO

## 2020-01-31 NOTE — PROGRESS NOTES
Occupational Therapy Daily Treatment Note     Date: 1/31/2020  Name: [place] [facility] Number: [id number]    Therapy Diagnosis:   Encounter Diagnosis   Name Primary?    Finger stiffness, left      Physician: Quita Cross MD  Medical Diagnosis: Left LF PIP contracture  Physician Orders: Eval/tx, splinting  Evaluation Date: 12/20/2019  Authorization Period: 12/31/2019  Surgery Date and Procedure: 11/26/2019  Date of Return to MD: MALATHI     Visit #: 3 of 20  Time In: 4:00 PM  Time Out: 5:00 PM     Precautions: Standard     Subjective     Pt reports: No new c/o  Response to previous treatment:Cooper well    Pain: 0/10    Objective   [place] received the following supervised modalities after being cleared for contradictions for 15 minutes:   -Paraffin w/ LMB     [place] received therapeutic exercises for 40 minutes including:  -Waves, hooks, comp fists, lifts, blocked PIP ext 20  -Isospheres 3'  -Coins 1 container  -Pink putty molding 3', squeezes 20, LF pinches 3 logs  -PROM PIP ext 10 count x 10   -Large red flexbar WE/WF/UD/RD, frowns/smiles 3x10       Home Exercises and Education Provided     Education provided:   - Progress towards goals     Written Home Exercises Provided: Patient instructed to cont prior HEP.  Exercises were reviewed and [place] was able to demonstrate them prior to the end of the session.  [place] demonstrated good  understanding of the HEP provided.   .   See EMR under Patient Instructions for exercises provided prior visit.        Assessment     Pt would continue to benefit from skilled OT to maximize LUE function.     [place] is progressing towards his goals and there are no updates to goals at this time. Pt prognosis is Fair.     Pt will continue to benefit from skilled outpatient occupational therapy to address the deficits listed in the problem list on initial evaluation provide pt/family education and to maximize pt's level of independence in the home and community  environment.       Pt's spiritual, cultural and educational needs considered and pt agreeable to plan of care and goals.    Goals:   LTG's (8 weeks):  1)   Increase ROM 25 degrees in Middle finger PIP joint dorsal aspect to increase functional hand use for preparation to grasp large objects.  2)   Increase  strength 50 lbs. to grasp chain.  3)   Increase 3J pinch 6 psis for grasping boot straps.  4)   Decrease complaints of pain to  2 out of 10 at worst to increase functional hand use for ADL/work/leisure activities.  5)   Pt will return to near to prior level of function for ADLs and household management reporting I or Mod I with ADLs (dressing, feeding, grooming, toileting).      STG's (4 weeks)  1)   Patient to be IND with HEP and modalities for pain/edema managment.  2)   Increase PIP flex ROM 10 degrees to increase functional hand use for ADLs/work/leisure activities.  3)   Increase  strength 20 lbs. to improve functional grasp for ADLs/work/leisure activities.   4)   Increase 3J pinch 3 psi's to increase independence with button and FM Coordination.   5)   Patient to be IND wiht Orthotic use, wear and care precautions.   6)   Decrease complaints of pain to  4 out of 10 at worst to increase functional hand use for ADL/work/leisure activities.       Plan   Cont OT to address above goals.        LEIDA Posey OTR/L, CHT

## 2020-04-09 ENCOUNTER — NURSE TRIAGE (OUTPATIENT)
Dept: ADMINISTRATIVE | Facility: CLINIC | Age: 60
End: 2020-04-09

## 2020-04-09 NOTE — TELEPHONE ENCOUNTER
Pt states his coworker tested positive for coronavirus, last known exposure to coworker was yesterday. Pt states he feels well, but is concerned regarding exposure. Pt states he has infrequent cough that began Monday. Pt advised per protocol and pt verbalizes understanding. Pt advised to have virtual visit and pt states he is unable to have virtual visit. Ready responders called to see pt and connected to pt.     Reason for Disposition   [1] COVID-19 infection diagnosed or suspected AND [2] mild symptoms (fever, cough) AND [3] no trouble breathing or other complications    Additional Information   Negative: SEVERE difficulty breathing (e.g., struggling for each breath, speaks in single words)   Negative: SEVERE or constant chest pain (Exception: mild central chest pain, present only when coughing)   Negative: MODERATE difficulty breathing (e.g., speaks in phrases, SOB even at rest, pulse 100-120)   Negative: MILD difficulty breathing (e.g., minimal/no SOB at rest, SOB with walking, pulse <100)   Negative: Chest pain   Negative: Patient sounds very sick or weak to the triager   Negative: Difficult to awaken or acting confused (e.g., disoriented, slurred speech)   Negative: Bluish (or gray) lips or face now   Negative: Shock suspected (e.g., cold/pale/clammy skin, too weak to stand, low BP, rapid pulse)   Negative: Sounds like a life-threatening emergency to the triager   Negative: Fever > 103 F (39.4 C)   Negative: [1] Fever > 101 F (38.3 C) AND [2] age > 60   Negative: [1] Fever > 100.0 F (37.8 C) AND [2] bedridden (e.g., nursing home patient, CVA, chronic illness, recovering from surgery)   Negative: HIGH RISK patient (e.g., age > 64 years, diabetes, heart or lung disease, weak immune system)   Negative: Fever present > 3 days (72 hours)   Negative: [1] Fever returns after gone for over 24 hours AND [2] symptoms worse or not improved   Negative: [1] Continuous (nonstop) coughing interferes with  work or school AND [2] no improvement using cough treatment per protocol   Negative: Cough present > 3 weeks    Protocols used: CORONAVIRUS (COVID-19) DIAGNOSED OR [id number]

## 2020-06-13 ENCOUNTER — HOSPITAL ENCOUNTER (EMERGENCY)
Facility: HOSPITAL | Age: 60
Discharge: HOME | End: 2020-06-13
Attending: EMERGENCY MEDICINE
Payer: COMMERCIAL

## 2020-06-13 VITALS
TEMPERATURE: 98 F | HEART RATE: 79 BPM | WEIGHT: 240 LBS | HEIGHT: 75 IN | OXYGEN SATURATION: 99 % | RESPIRATION RATE: 18 BRPM | SYSTOLIC BLOOD PRESSURE: 144 MMHG | BODY MASS INDEX: 29.84 KG/M2 | DIASTOLIC BLOOD PRESSURE: 82 MMHG

## 2020-06-13 DIAGNOSIS — L98.9 LESION OF SKIN OF SCALP: Primary | ICD-10-CM

## 2020-06-13 PROCEDURE — 99281 EMR DPT VST MAYX REQ PHY/QHP: CPT | Mod: ER

## 2020-06-13 NOTE — ED PROVIDER NOTES
"Encounter Date: 6/13/2020    SCRIBE #1 NOTE: I, Vinicio Charles, am scribing for, and in the presence of,  Dr. Franklin. I have scribed the following portions of the note - Other sections scribed: HPI, ROS, PE.       History     Chief Complaint   Patient presents with    bruise on scalp     Pt states," I have a purple bruise on my head for two weeks that I want checked out."     Inessa Peralta is a 60 y.o. male who presents to the ED complaining of 2 dark spots to the back of the scalp that his daughter noticed 1 week ago. Denies any injuries, pain, or trauma.    The history is provided by the patient. No  was used.     Review of patient's allergies indicates:  No Known Allergies  History reviewed. No pertinent past medical history.  Past Surgical History:   Procedure Laterality Date    KNEE SURGERY       Family History   Problem Relation Age of Onset    Amblyopia Neg Hx     Blindness Neg Hx     Cancer Neg Hx     Cataracts Neg Hx     Diabetes Neg Hx     Glaucoma Neg Hx     Hypertension Neg Hx     Macular degeneration Neg Hx     Retinal detachment Neg Hx     Strabismus Neg Hx     Stroke Neg Hx     Thyroid disease Neg Hx      Social History     Tobacco Use    Smoking status: Never Smoker    Smokeless tobacco: Never Used   Substance Use Topics    Alcohol use: No    Drug use: Not on file     Review of Systems   Constitutional: Negative for fever.   HENT: Negative for rhinorrhea and sore throat.    Respiratory: Negative for shortness of breath.    Cardiovascular: Negative for leg swelling.   Skin: Positive for color change. Negative for rash.   Neurological: Negative for numbness and headaches.   All other systems reviewed and are negative.      Physical Exam     Initial Vitals [06/13/20 1514]   BP Pulse Resp Temp SpO2   (!) 144/82 79 18 97.9 °F (36.6 °C) 99 %      MAP       --         Patient gave consent to have physical exam performed.    Physical Exam    Nursing note and vitals " reviewed.  Constitutional: He appears well-developed and well-nourished.   HENT:   Head: Normocephalic and atraumatic.       Right Ear: External ear normal.   Left Ear: External ear normal.   Nose: Nose normal.   Mouth/Throat: Oropharynx is clear and moist.   Eyes: Conjunctivae and EOM are normal. Pupils are equal, round, and reactive to light.   Neck: Normal range of motion. Neck supple.   Cardiovascular: Normal rate, regular rhythm, S1 normal, S2 normal, normal heart sounds and intact distal pulses. Exam reveals no gallop and no friction rub.    No murmur heard.  Pulmonary/Chest: Effort normal and breath sounds normal. No stridor. No respiratory distress. He has no decreased breath sounds. He has no wheezes. He has no rhonchi. He has no rales. He exhibits no tenderness.   Abdominal: Soft. Bowel sounds are normal. He exhibits no distension and no mass. There is no abdominal tenderness. There is no rigidity, no rebound and no guarding.   Musculoskeletal: Normal range of motion.   Neurological: He is alert and oriented to person, place, and time. No cranial nerve deficit or sensory deficit. GCS score is 15. GCS eye subscore is 4. GCS verbal subscore is 5. GCS motor subscore is 6.   Skin: Skin is warm and dry. Capillary refill takes less than 2 seconds. No rash noted.   Psychiatric: He has a normal mood and affect. His behavior is normal.         ED Course   Procedures              Medical Decision Making:   History:   Old Medical Records: I decided to obtain old medical records.  Chief complaint: skin discoloration  Differential diagnosis: bruise, contusion, abrasion, skin discoloration, rash, and skin lesion.    Treatment in the ED: PE, referral.  Patient reports feeling better after treatment in the ER.      Discussed treatment, prescriptions, labs, and imaging results.    Discharge home with referral.  Fill and take prescriptions as directed.  Return to the ED if symptoms worsen or do not resolve.   Answered  questions and discussed discharge plan.    Patient feels better and is ready for discharge.  Follow up with PCP/specialist in 1 day.            Scribe Attestation:   Scribe #1: I performed the above scribed service and the documentation accurately describes the services I performed. I attest to the accuracy of the note.     I, Dr. Jacinda Franklin, personally performed the services described in this documentation. This document was produced by a scribe under my direction and in my presence. All medical record entries made by the scribe were at my direction and in my presence.  I have reviewed the chart and agree that the record reflects my personal performance and is accurate and complete. Jacinda Franklin, .     06/14/2020 7:32 PM                        Clinical Impression:     1. Lesion of skin of scalp                ED Disposition Condition    Discharge Stable        ED Prescriptions     None        Follow-up Information     Follow up With Specialties Details Why Contact Info    Mark Petit MD Family Medicine Schedule an appointment as soon as possible for a visit in 1 day  [address]  [place] [address]  [phone]      Lizbeth Taylor MD Dermatology Schedule an appointment as soon as possible for a visit in 1 day  [address] [address]  [phone]                                       Jacinda Franklin DO  06/14/20 1932

## 2021-03-29 ENCOUNTER — IMMUNIZATION (OUTPATIENT)
Dept: PHARMACY | Facility: CLINIC | Age: 61
End: 2021-03-29

## 2021-03-29 DIAGNOSIS — Z23 NEED FOR VACCINATION: Primary | ICD-10-CM

## 2021-04-26 ENCOUNTER — IMMUNIZATION (OUTPATIENT)
Dept: PHARMACY | Facility: CLINIC | Age: 61
End: 2021-04-26
Payer: COMMERCIAL

## 2021-04-26 DIAGNOSIS — Z23 NEED FOR VACCINATION: Primary | ICD-10-CM

## 2021-12-15 ENCOUNTER — IMMUNIZATION (OUTPATIENT)
Dept: INTERNAL MEDICINE | Facility: CLINIC | Age: 61
End: 2021-12-15
Payer: COMMERCIAL

## 2021-12-15 DIAGNOSIS — Z23 NEED FOR VACCINATION: Primary | ICD-10-CM

## 2021-12-15 PROCEDURE — 0064A COVID-19, MRNA, LNP-S, PF, 100 MCG/0.25 ML DOSE VACCINE (MODERNA BOOSTER): CPT | Mod: CV19,PBBFAC | Performed by: INTERNAL MEDICINE

## 2022-05-26 ENCOUNTER — IMMUNIZATION (OUTPATIENT)
Dept: PRIMARY CARE CLINIC | Facility: CLINIC | Age: 62
End: 2022-05-26
Payer: COMMERCIAL

## 2022-05-26 DIAGNOSIS — Z23 NEED FOR VACCINATION: Primary | ICD-10-CM

## 2022-05-26 PROCEDURE — 91306 COVID-19, MRNA, LNP-S, PF, 100 MCG/0.25 ML DOSE VACCINE (MODERNA BOOSTER): CPT | Mod: PBBFAC | Performed by: INTERNAL MEDICINE

## 2022-05-26 PROCEDURE — 0064A COVID-19, MRNA, LNP-S, PF, 100 MCG/0.25 ML DOSE VACCINE (MODERNA BOOSTER): CPT | Mod: CV19,PBBFAC | Performed by: INTERNAL MEDICINE

## 2023-10-04 ENCOUNTER — OFFICE VISIT (OUTPATIENT)
Dept: FAMILY MEDICINE | Facility: CLINIC | Age: 63
End: 2023-10-04
Payer: COMMERCIAL

## 2023-10-04 VITALS
OXYGEN SATURATION: 100 % | DIASTOLIC BLOOD PRESSURE: 75 MMHG | WEIGHT: 227.5 LBS | TEMPERATURE: 98 F | SYSTOLIC BLOOD PRESSURE: 135 MMHG | HEIGHT: 75 IN | HEART RATE: 75 BPM | BODY MASS INDEX: 28.29 KG/M2

## 2023-10-04 DIAGNOSIS — E66.3 OVERWEIGHT (BMI 25.0-29.9): ICD-10-CM

## 2023-10-04 DIAGNOSIS — Z00.00 ANNUAL PHYSICAL EXAM: Primary | ICD-10-CM

## 2023-10-04 DIAGNOSIS — N52.8 OTHER MALE ERECTILE DYSFUNCTION: ICD-10-CM

## 2023-10-04 DIAGNOSIS — M21.6X9 EQUINUS DEFORMITY OF FOOT: ICD-10-CM

## 2023-10-04 DIAGNOSIS — M76.71 PERONEAL TENDINITIS, RIGHT: ICD-10-CM

## 2023-10-04 PROCEDURE — 3075F SYST BP GE 130 - 139MM HG: CPT | Mod: CPTII,S$GLB,, | Performed by: FAMILY MEDICINE

## 2023-10-04 PROCEDURE — 3078F PR MOST RECENT DIASTOLIC BLOOD PRESSURE < 80 MM HG: ICD-10-PCS | Mod: CPTII,S$GLB,, | Performed by: FAMILY MEDICINE

## 2023-10-04 PROCEDURE — 3288F PR FALLS RISK ASSESSMENT DOCUMENTED: ICD-10-PCS | Mod: CPTII,S$GLB,, | Performed by: FAMILY MEDICINE

## 2023-10-04 PROCEDURE — 3008F BODY MASS INDEX DOCD: CPT | Mod: CPTII,S$GLB,, | Performed by: FAMILY MEDICINE

## 2023-10-04 PROCEDURE — 1101F PR PT FALLS ASSESS DOC 0-1 FALLS W/OUT INJ PAST YR: ICD-10-PCS | Mod: CPTII,S$GLB,, | Performed by: FAMILY MEDICINE

## 2023-10-04 PROCEDURE — 1160F PR REVIEW ALL MEDS BY PRESCRIBER/CLIN PHARMACIST DOCUMENTED: ICD-10-PCS | Mod: CPTII,S$GLB,, | Performed by: FAMILY MEDICINE

## 2023-10-04 PROCEDURE — 3008F PR BODY MASS INDEX (BMI) DOCUMENTED: ICD-10-PCS | Mod: CPTII,S$GLB,, | Performed by: FAMILY MEDICINE

## 2023-10-04 PROCEDURE — 99387 PR PREVENTIVE VISIT,NEW,65 & OVER: ICD-10-PCS | Mod: S$GLB,,, | Performed by: FAMILY MEDICINE

## 2023-10-04 PROCEDURE — 1160F RVW MEDS BY RX/DR IN RCRD: CPT | Mod: CPTII,S$GLB,, | Performed by: FAMILY MEDICINE

## 2023-10-04 PROCEDURE — 99999 PR PBB SHADOW E&M-EST. PATIENT-LVL IV: ICD-10-PCS | Mod: PBBFAC,,, | Performed by: FAMILY MEDICINE

## 2023-10-04 PROCEDURE — 99999 PR PBB SHADOW E&M-EST. PATIENT-LVL IV: CPT | Mod: PBBFAC,,, | Performed by: FAMILY MEDICINE

## 2023-10-04 PROCEDURE — 3075F PR MOST RECENT SYSTOLIC BLOOD PRESS GE 130-139MM HG: ICD-10-PCS | Mod: CPTII,S$GLB,, | Performed by: FAMILY MEDICINE

## 2023-10-04 PROCEDURE — 3078F DIAST BP <80 MM HG: CPT | Mod: CPTII,S$GLB,, | Performed by: FAMILY MEDICINE

## 2023-10-04 PROCEDURE — 1159F MED LIST DOCD IN RCRD: CPT | Mod: CPTII,S$GLB,, | Performed by: FAMILY MEDICINE

## 2023-10-04 PROCEDURE — 99387 INIT PM E/M NEW PAT 65+ YRS: CPT | Mod: S$GLB,,, | Performed by: FAMILY MEDICINE

## 2023-10-04 PROCEDURE — 1101F PT FALLS ASSESS-DOCD LE1/YR: CPT | Mod: CPTII,S$GLB,, | Performed by: FAMILY MEDICINE

## 2023-10-04 PROCEDURE — 1159F PR MEDICATION LIST DOCUMENTED IN MEDICAL RECORD: ICD-10-PCS | Mod: CPTII,S$GLB,, | Performed by: FAMILY MEDICINE

## 2023-10-04 PROCEDURE — 3288F FALL RISK ASSESSMENT DOCD: CPT | Mod: CPTII,S$GLB,, | Performed by: FAMILY MEDICINE

## 2023-10-04 RX ORDER — SILDENAFIL 25 MG/1
25 TABLET, FILM COATED ORAL DAILY PRN
Qty: 30 TABLET | Refills: 3 | Status: SHIPPED | OUTPATIENT
Start: 2023-10-04 | End: 2023-10-05 | Stop reason: SDUPTHER

## 2023-10-04 NOTE — TELEPHONE ENCOUNTER
Pt states that he was in the office this morning and the provider told him about a pharmacy on [facility] and that where he would like to have his prescription fill there. Pt did not know the name of the pharmacy, but says his provider does. Please advise further.

## 2023-10-04 NOTE — TELEPHONE ENCOUNTER
----- Message from Tianna Lopez sent at 10/4/2023  9:32 AM CDT -----  Regarding: Refill Request  .Type: RX Refill Request    Who Called: self   Have you contacted your pharmacy:    Refill or New Rx: Refill     RX Name and Strength:sildenafiL (VIAGRA) 25 MG tablet    Preferred Pharmacy with phone number: [facility] [address]    Local or Mail Order: Local    Ordering Provider: Mark Petit     Would the patient rather a call back or a response via [facility]? call    Best Call Back Number: [phone]    Additional Information: pt is currently at the pharm waiting

## 2023-10-04 NOTE — PROGRESS NOTES
"  Physical Exam  /75   Pulse 75   Temp 98.2 °F (36.8 °C) (Oral)   Ht 6' 3" (1.905 m)   Wt 103.2 kg (227 lb 8.2 oz)   SpO2 100%   BMI 28.44 kg/m²      Office Visit    Patient Name: Inessa CRISOSTOMO    : 1960  MRN: [id number]      Assessment/Plan:  Inessa CRISOSTOMO is a 63 y.o. male who presents today for :    Annual physical exam  -     Hemoglobin A1C; Future; Expected date: 10/04/2023  -     CBC Without Differential; Future; Expected date: 10/04/2023  -     Comprehensive Metabolic Panel; Future; Expected date: 10/04/2023  -     Lipid Panel; Future; Expected date: 10/04/2023  -     Prostate Specific Antigen, Diagnostic; Future; Expected date: 10/04/2023  -Overweight (BMI 25.0-29.9)  -anticipatory guidance provided with age appropriate preventative services discussed, healthy diet and regular physical exercise also discussed with patient  -d/w pt about the importance of portion control  -Recommend 15-30 minutes of moderate intensity exercise 5 days/week.    -Male erectile dysfunction - controlled on Viagra  -     sildenafiL (VIAGRA) 25 MG tablet; Take 1 tablet (25 mg total) by mouth daily as needed for Erectile Dysfunction.  Dispense: 30 tablet; Refill: 3  -advised avoidance of smoking and alcohol  -refilled Viagra, advised pt to take one tablet one hour prior to sex. If if no effect, may take second tablet right away - but no more than 2 tabs total in a 24hour period. Potential side effects associated with medication discussed with patient, which patient voices understanding  -Advised Go to ED immediately if patient experiences painful erection lasting more than four hours. Avoid taking nitrates with ED medication.      -Equinus deformity of foot  -     Ambulatory referral/consult to Orthopedics; Future; Expected date: 10/11/2023  -Peroneal tendinitis, right  -     Ambulatory referral/consult to Orthopedics; Future; Expected date: 10/11/2023  -this is a chronic recurrent issue for patient          Follow " up 6mo    This note was created by combination of typed  and MModal dictation.  Transcription errors may be present.  If there are any questions, please contact me.        ----------------------------------------------------------------------------------------------------------------------      HPI:  Patient Care Team:  Mark Petit MD as PCP - General (Family Medicine)    [place] is a 63 y.o. male with      Patient Active Problem List   Diagnosis    Finger pain    Muscle cramping    Gait abnormality    Leg weakness    Decreased range of motion of finger    Finger stiffness, left    -Equinus deformity of foot    -Peroneal tendinitis, right    -Male erectile dysfunction - controlled on Viagra       This patient is new to me     [place] presents today for:  Annual Exam       His last known visit to our clinic was over 8 years ago. He is doing well overall except for chronic recurrent R foot pain secondary to equinus deformity, for which he previously had seen Podiatry, tried different orthotics/medications as well went to PT, but still did not get any relief, which is why he stopped going to PT. He would like to get a new referral to see a foot specialist to reassess his condition. Health maintenance-wise, he is due for routine labs. He is up to date on colon cancer screening. He also has a Hx of ED, for which he would like to get a refill on Viagra - no prior adverse side effects with Viagra use. Otherwise, no other acute issues during this visit.      Additional ROS    CONST: no fever, no activity change, weight stable.   EYES: no vision change.   ENT: no sore throat. No dysphagia.   CV: no CP with exertion  RESP: no SOB  GI: no N/V/diarrhea/constipation  : no urinary concerns  MSK: see HPI  SKIN: no new rashes  NEURO: no focal deficits.   PSYCH: no new issues.   ENDOCRINE: no polyuria.           Current Medications  Medications reviewed and updated.       Current Outpatient Medications:      "sildenafiL (VIAGRA) 25 MG tablet, Take 1 tablet (25 mg total) by mouth daily as needed for Erectile Dysfunction., Disp: 30 tablet, Rfl: 3    Past Surgical History:   Procedure Laterality Date    KNEE SURGERY         Family History   Problem Relation Age of Onset    Amblyopia Neg Hx     Blindness Neg Hx     Cancer Neg Hx     Cataracts Neg Hx     Diabetes Neg Hx     Glaucoma Neg Hx     Hypertension Neg Hx     Macular degeneration Neg Hx     Retinal detachment Neg Hx     Strabismus Neg Hx     Stroke Neg Hx     Thyroid disease Neg Hx        Social History     Socioeconomic History    Marital status:    Tobacco Use    Smoking status: Never    Smokeless tobacco: Never   Substance and Sexual Activity    Alcohol use: No           Allergies   Review of patient's allergies indicates:  No Known Allergies          Review of Systems  See HPI      [unfilled]  /75   Pulse 75   Temp 98.2 °F (36.8 °C) (Oral)   Ht 6' 3" (1.905 m)   Wt 103.2 kg (227 lb 8.2 oz)   SpO2 100%   BMI 28.44 kg/m²     GEN: NAD, well developed, pleasant, well nourished  HEENT: NCAT, PERRLA, EOMI, sclera clear, anicteric, bilateral ear exam wnl, O/P clear, MMM with no lesions  NECK: normal, supple with midline trachea, no LAD, no thyromegaly  LUNGS: CTAB, no w/r/r, no increased work of breathing   HEART: RRR, normal S1 and S2, no m/r/g, no edema  ABD: s/nt/nd, NABS  SKIN: normal turgor, no rashes  PSYCH: AOx3, appropriate mood and affect  MSK: warm/well perfused, normal ROM in all extremities except for R ankle due to +equinus deformity, no c/c/e.   NEURO: normal without focal findings, CN II-XII are grossly intact.  Sensation/strength grossly normal, +limping gait.           "

## 2023-10-04 NOTE — TELEPHONE ENCOUNTER
----- Message from Alejandro Franklyn sent at 10/4/2023  2:36 PM CDT -----  Regarding: Self [phone]  Type: Patient Call Back    Who called: Self     What is the request in detail: called in regards to talking to the office in regards to the pt's medication. Would like a call back.     Can the clinic reply by [facility]? No     Would the patient rather a call back or a response via [facility]? Call back     Best call back number: [phone]     Additional Information:    Thank you.

## 2023-10-04 NOTE — TELEPHONE ENCOUNTER
No care due was identified.  [facility] Embedded Care Due Messages. Reference number: [id number].   10/04/2023 9:39:22 AM CDT

## 2023-10-05 ENCOUNTER — TELEPHONE (OUTPATIENT)
Dept: FAMILY MEDICINE | Facility: CLINIC | Age: 63
End: 2023-10-05
Payer: COMMERCIAL

## 2023-10-05 RX ORDER — SILDENAFIL 25 MG/1
25 TABLET, FILM COATED ORAL DAILY PRN
Qty: 30 TABLET | Refills: 3 | Status: SHIPPED | OUTPATIENT
Start: 2023-10-05 | End: 2024-01-09

## 2023-10-05 RX ORDER — SILDENAFIL 25 MG/1
25 TABLET, FILM COATED ORAL DAILY PRN
Qty: 30 TABLET | Refills: 3 | OUTPATIENT
Start: 2023-10-05 | End: 2024-10-04

## 2023-10-05 NOTE — TELEPHONE ENCOUNTER
----- Message from Min Figueroa MA sent at 10/5/2023 10:53 AM CDT -----  Type: Patient Call Back    Who called: [facility]    What is the request in detail: requesting a new prescription for the pt..     sildenafiL (VIAGRA) 25 MG tablet    Can the clinic reply by [facility]?NO    Would the patient rather a call back or a response via [facility]? Yes, call    Best call back number: [phone]

## 2023-10-05 NOTE — TELEPHONE ENCOUNTER
Provider gave the pt a paper script at his visit, but the pt stated he lost that prescription. Therefore his provider sent the prescription into the pharmacy [facility] on [address]

## 2023-10-10 DIAGNOSIS — M21.6X9 EQUINUS DEFORMITY OF FOOT: Primary | ICD-10-CM

## 2023-10-10 DIAGNOSIS — M76.71 PERONEAL TENDINITIS, RIGHT: Primary | ICD-10-CM

## 2023-10-23 ENCOUNTER — LAB VISIT (OUTPATIENT)
Dept: LAB | Facility: HOSPITAL | Age: 63
End: 2023-10-23
Attending: FAMILY MEDICINE
Payer: COMMERCIAL

## 2023-10-23 DIAGNOSIS — Z00.00 ANNUAL PHYSICAL EXAM: ICD-10-CM

## 2023-10-23 LAB
ALBUMIN SERPL BCP-MCNC: 4 G/DL (ref 3.5–5.2)
ALP SERPL-CCNC: 57 U/L (ref 55–135)
ALT SERPL W/O P-5'-P-CCNC: 28 U/L (ref 10–44)
ANION GAP SERPL CALC-SCNC: 8 MMOL/L (ref 8–16)
AST SERPL-CCNC: 31 U/L (ref 10–40)
BILIRUB SERPL-MCNC: 0.6 MG/DL (ref 0.1–1)
BUN SERPL-MCNC: 18 MG/DL (ref 8–23)
CALCIUM SERPL-MCNC: 9.4 MG/DL (ref 8.7–10.5)
CHLORIDE SERPL-SCNC: 106 MMOL/L (ref 95–110)
CHOLEST SERPL-MCNC: 174 MG/DL (ref 120–199)
CHOLEST/HDLC SERPL: 5 {RATIO} (ref 2–5)
CO2 SERPL-SCNC: 24 MMOL/L (ref 23–29)
COMPLEXED PSA SERPL-MCNC: 0.65 NG/ML (ref 0–4)
CREAT SERPL-MCNC: 0.9 MG/DL (ref 0.5–1.4)
ERYTHROCYTE [DISTWIDTH] IN BLOOD BY AUTOMATED COUNT: 12.7 % (ref 11.5–14.5)
EST. GFR  (NO RACE VARIABLE): >60 ML/MIN/1.73 M^2
GLUCOSE SERPL-MCNC: 100 MG/DL (ref 70–110)
HCT VFR BLD AUTO: 44.7 % (ref 40–54)
HDLC SERPL-MCNC: 35 MG/DL (ref 40–75)
HDLC SERPL: 20.1 % (ref 20–50)
HGB BLD-MCNC: 14.5 G/DL (ref 14–18)
LDLC SERPL CALC-MCNC: 111.6 MG/DL (ref 63–159)
MCH RBC QN AUTO: 27.9 PG (ref 27–31)
MCHC RBC AUTO-ENTMCNC: 32.4 G/DL (ref 32–36)
MCV RBC AUTO: 86 FL (ref 82–98)
NONHDLC SERPL-MCNC: 139 MG/DL
PLATELET # BLD AUTO: 225 K/UL (ref 150–450)
PMV BLD AUTO: 9.5 FL (ref 9.2–12.9)
POTASSIUM SERPL-SCNC: 4.1 MMOL/L (ref 3.5–5.1)
PROT SERPL-MCNC: 7.3 G/DL (ref 6–8.4)
RBC # BLD AUTO: 5.19 M/UL (ref 4.6–6.2)
SODIUM SERPL-SCNC: 138 MMOL/L (ref 136–145)
TRIGL SERPL-MCNC: 137 MG/DL (ref 30–150)
WBC # BLD AUTO: 4.54 K/UL (ref 3.9–12.7)

## 2023-10-23 PROCEDURE — 36415 COLL VENOUS BLD VENIPUNCTURE: CPT | Mod: PO | Performed by: FAMILY MEDICINE

## 2023-10-23 PROCEDURE — 85027 COMPLETE CBC AUTOMATED: CPT | Performed by: FAMILY MEDICINE

## 2023-10-23 PROCEDURE — 80061 LIPID PANEL: CPT | Performed by: FAMILY MEDICINE

## 2023-10-23 PROCEDURE — 83036 HEMOGLOBIN GLYCOSYLATED A1C: CPT | Performed by: FAMILY MEDICINE

## 2023-10-23 PROCEDURE — 80053 COMPREHEN METABOLIC PANEL: CPT | Performed by: FAMILY MEDICINE

## 2023-10-23 PROCEDURE — 84153 ASSAY OF PSA TOTAL: CPT | Performed by: FAMILY MEDICINE

## 2023-10-24 LAB
ESTIMATED AVG GLUCOSE: 111 MG/DL (ref 68–131)
HBA1C MFR BLD: 5.5 % (ref 4–5.6)

## 2023-12-28 ENCOUNTER — TELEPHONE (OUTPATIENT)
Dept: FAMILY MEDICINE | Facility: CLINIC | Age: 63
End: 2023-12-28
Payer: COMMERCIAL

## 2023-12-28 DIAGNOSIS — I10 HYPERTENSION, UNSPECIFIED TYPE: Primary | ICD-10-CM

## 2023-12-28 NOTE — TELEPHONE ENCOUNTER
----- Message from Alejandro Estes sent at 12/28/2023  9:57 AM CST -----  Regarding: Self MRN:  [id number]  Type:  Patient Requesting Referral    Who Called: Self     Referral to What Specialty: Cardiology    Reason for Referral: High blood pressure/ ER Follow up    Does the patient want the referral with a specific physician?: Dr. Jagdish Patel     [facility] the [facility]     Would the patient rather a call back or a response via [facility]?  Call back     Best Call Back Number:  [phone]    Additional Information:

## 2024-01-09 ENCOUNTER — OFFICE VISIT (OUTPATIENT)
Dept: CARDIOLOGY | Facility: CLINIC | Age: 64
End: 2024-01-09
Payer: COMMERCIAL

## 2024-01-09 VITALS
HEIGHT: 75 IN | RESPIRATION RATE: 18 BRPM | SYSTOLIC BLOOD PRESSURE: 152 MMHG | OXYGEN SATURATION: 95 % | BODY MASS INDEX: 28.48 KG/M2 | DIASTOLIC BLOOD PRESSURE: 90 MMHG | WEIGHT: 229.06 LBS | HEART RATE: 78 BPM

## 2024-01-09 DIAGNOSIS — I10 HYPERTENSION, UNSPECIFIED TYPE: ICD-10-CM

## 2024-01-09 DIAGNOSIS — I10 ESSENTIAL HYPERTENSION: Primary | ICD-10-CM

## 2024-01-09 DIAGNOSIS — R94.31 ABNORMAL EKG: ICD-10-CM

## 2024-01-09 DIAGNOSIS — E78.5 DYSLIPIDEMIA: ICD-10-CM

## 2024-01-09 PROCEDURE — 3077F SYST BP >= 140 MM HG: CPT | Mod: CPTII,S$GLB,, | Performed by: INTERNAL MEDICINE

## 2024-01-09 PROCEDURE — 1160F RVW MEDS BY RX/DR IN RCRD: CPT | Mod: CPTII,S$GLB,, | Performed by: INTERNAL MEDICINE

## 2024-01-09 PROCEDURE — 99204 OFFICE O/P NEW MOD 45 MIN: CPT | Mod: S$GLB,,, | Performed by: INTERNAL MEDICINE

## 2024-01-09 PROCEDURE — 3008F BODY MASS INDEX DOCD: CPT | Mod: CPTII,S$GLB,, | Performed by: INTERNAL MEDICINE

## 2024-01-09 PROCEDURE — 1101F PT FALLS ASSESS-DOCD LE1/YR: CPT | Mod: CPTII,S$GLB,, | Performed by: INTERNAL MEDICINE

## 2024-01-09 PROCEDURE — 3288F FALL RISK ASSESSMENT DOCD: CPT | Mod: CPTII,S$GLB,, | Performed by: INTERNAL MEDICINE

## 2024-01-09 PROCEDURE — 3080F DIAST BP >= 90 MM HG: CPT | Mod: CPTII,S$GLB,, | Performed by: INTERNAL MEDICINE

## 2024-01-09 PROCEDURE — 93000 ELECTROCARDIOGRAM COMPLETE: CPT | Mod: S$GLB,,, | Performed by: INTERNAL MEDICINE

## 2024-01-09 PROCEDURE — 99999 PR PBB SHADOW E&M-EST. PATIENT-LVL IV: CPT | Mod: PBBFAC,,, | Performed by: INTERNAL MEDICINE

## 2024-01-09 PROCEDURE — 1159F MED LIST DOCD IN RCRD: CPT | Mod: CPTII,S$GLB,, | Performed by: INTERNAL MEDICINE

## 2024-01-09 PROCEDURE — 1126F AMNT PAIN NOTED NONE PRSNT: CPT | Mod: CPTII,S$GLB,, | Performed by: INTERNAL MEDICINE

## 2024-01-09 RX ORDER — ATORVASTATIN CALCIUM 20 MG/1
20 TABLET, FILM COATED ORAL NIGHTLY
Qty: 90 TABLET | Refills: 3 | Status: SHIPPED | OUTPATIENT
Start: 2024-01-09

## 2024-01-09 RX ORDER — AMLODIPINE BESYLATE 5 MG/1
5 TABLET ORAL DAILY
Qty: 90 TABLET | Refills: 3 | Status: SHIPPED | OUTPATIENT
Start: 2024-01-09

## 2024-01-09 RX ORDER — CHLORHEXIDINE GLUCONATE ORAL RINSE 1.2 MG/ML
SOLUTION DENTAL
COMMUNITY
Start: 2023-11-27

## 2024-01-09 NOTE — PROGRESS NOTES
CARDIOVASCULAR CONSULTATION    REASON FOR CONSULT:   Inessa CRISOSTOMO is a 63 y.o. male who presents for HTN.    PCP: Marisabel  HISTORY OF PRESENT ILLNESS:   The patient is a very pleasant 63-year-old man who is accompanied by his cousin to today's visit.  He was seen in the emergency room several months ago with a headache and severe hypertension and was instructed to follow up with Cardiology.  He denies any further headache.  He has had no angina, dyspnea, palpitations, or syncope.  There has been no PND, orthopnea, melena, hematuria, or claudication symptoms.      Family history is notable for the absence premature CAD.      The patient is a nonsmoker.  He denies alcohol excess or illicit drug use.  [place]    CARDIOVASCULAR HISTORY:   none    PAST MEDICAL HISTORY:     Past Medical History:   Diagnosis Date    Hypertension        PAST SURGICAL HISTORY:     Past Surgical History:   Procedure Laterality Date    KNEE SURGERY         ALLERGIES AND MEDICATION:   Review of patient's allergies indicates:  No Known Allergies     Medication List            Accurate as of January 9, 2024 10:47 AM. If you have any questions, ask your nurse or doctor.                START taking these medications      amLODIPine 5 MG tablet  Commonly known as: NORVASC  Take 1 tablet (5 mg total) by mouth once daily.  Started by: Jagdish Patel MD     atorvastatin 20 MG tablet  Commonly known as: LIPITOR  Take 1 tablet (20 mg total) by mouth every evening.  Started by: Jagdish Patel MD            CONTINUE taking these medications      chlorhexidine 0.12 % solution  Commonly known as: PERIDEX            STOP taking these medications      sildenafiL 25 MG tablet  Commonly known as: VIAGRA  Stopped by: Jagdish Patel MD               Where to Get Your Medications        These medications were sent to [facility]/pharmacy #[id number] [place], [place] [facility] [address] [facility]  [address] [address]      Phone:  "[phone]   amLODIPine 5 MG tablet  atorvastatin 20 MG tablet         SOCIAL HISTORY:     Social History     Socioeconomic History    Marital status:    Tobacco Use    Smoking status: Never    Smokeless tobacco: Never   Substance and Sexual Activity    Alcohol use: No       FAMILY HISTORY:     Family History   Problem Relation Age of Onset    Amblyopia Neg Hx     Blindness Neg Hx     Cancer Neg Hx     Cataracts Neg Hx     Diabetes Neg Hx     Glaucoma Neg Hx     Hypertension Neg Hx     Macular degeneration Neg Hx     Retinal detachment Neg Hx     Strabismus Neg Hx     Stroke Neg Hx     Thyroid disease Neg Hx        REVIEW OF SYSTEMS:   Review of Systems   Constitutional:  Negative for chills, diaphoresis and fever.   HENT:  Negative for nosebleeds.    Eyes:  Negative for blurred vision, double vision and photophobia.   Respiratory:  Negative for hemoptysis, shortness of breath and wheezing.    Cardiovascular:  Negative for chest pain, palpitations, orthopnea, claudication, leg swelling and PND.   Gastrointestinal:  Negative for abdominal pain, blood in stool, heartburn, melena, nausea and vomiting.   Genitourinary:  Negative for flank pain and hematuria.   Musculoskeletal:  Negative for falls, myalgias and neck pain.   Skin:  Negative for rash.   Neurological:  Negative for dizziness, seizures, loss of consciousness, weakness and headaches.   Endo/Heme/Allergies:  Negative for polydipsia. Does not bruise/bleed easily.   Psychiatric/Behavioral:  Negative for depression and memory loss. The patient is not nervous/anxious.        PHYSICAL EXAM:     Vitals:    01/09/24 1011   BP: (!) 152/90   Pulse: 78   Resp: 18    Body mass index is 28.63 kg/m².  Weight: 103.9 kg (229 lb 0.9 oz)   Height: 6' 3" (190.5 cm)     Physical Exam  Vitals reviewed.   Constitutional:       General: He is not in acute distress.     Appearance: Normal appearance. He is well-developed. He is not ill-appearing, toxic-appearing or " diaphoretic.   HENT:      Head: Normocephalic and atraumatic.   Eyes:      General: No scleral icterus.     Extraocular Movements: Extraocular movements intact.      Conjunctiva/sclera: Conjunctivae normal.      Pupils: Pupils are equal, round, and reactive to light.   Neck:      Thyroid: No thyromegaly.      Vascular: Normal carotid pulses. No carotid bruit or JVD.      Trachea: Trachea normal.   Cardiovascular:      Rate and Rhythm: Normal rate and regular rhythm.      Pulses:           Carotid pulses are 2+ on the right side and 2+ on the left side.     Heart sounds: S1 normal and S2 normal. No murmur heard.     No friction rub. No gallop.   Pulmonary:      Effort: Pulmonary effort is normal. No respiratory distress.      Breath sounds: Normal breath sounds. No stridor. No wheezing, rhonchi or rales.   Chest:      Chest wall: No tenderness.   Abdominal:      General: There is no distension.      Palpations: Abdomen is soft.   Musculoskeletal:         General: No swelling or tenderness. Normal range of motion.      Cervical back: Normal range of motion and neck supple. No edema or rigidity.      Right lower leg: No edema.      Left lower leg: No edema.   Feet:      Right foot:      Skin integrity: No ulcer.      Left foot:      Skin integrity: No ulcer.   Skin:     General: Skin is warm and dry.      Coloration: Skin is not jaundiced.   Neurological:      General: No focal deficit present.      Mental Status: He is alert and oriented to person, place, and time.      Cranial Nerves: No cranial nerve deficit.   Psychiatric:         Mood and Affect: Mood normal.         Speech: Speech normal.         Behavior: Behavior normal. Behavior is cooperative.         DATA:   EKG: (personally reviewed tracing)  1/9/24 SR 65, LVH/repol    Laboratory:  CBC:  Recent Labs   Lab 10/23/23  0853   WBC 4.54   Hemoglobin 14.5   Hematocrit 44.7   Platelets 225       CHEMISTRIES:  Recent Labs   Lab 10/23/23  0852   Glucose 100   Sodium  138   Potassium 4.1   BUN 18   Creatinine 0.9   eGFR >60.0   Calcium 9.4       CARDIAC BIOMARKERS:        COAGS:        LIPIDS/LFTS:  Recent Labs   Lab 10/23/23  0852   Cholesterol 174   Triglycerides 137   HDL 35 L   LDL Cholesterol 111.6   Non-HDL Cholesterol 139   AST 31   ALT 28     The 10-year ASCVD risk score (Sarai PATHAK, et al., 2019) is: 23%    Values used to calculate the score:      Age: 63 years      Sex: Male      Is Non- : Yes      Diabetic: No      Tobacco smoker: No      Systolic Blood Pressure: 152 mmHg      Is BP treated: Yes      HDL Cholesterol: 35 mg/dL      Total Cholesterol: 174 mg/dL      Cardiovascular Testing:  Ordered  Echo    ASSESSMENT:   # HTN, uncontrolled  # dyslipidemia, ASCVD calc suggests statin benefit  # abnl EKG, LVH/repol    PLAN:   Start amlod 5mg qd  Start atorva 20mg qhs  Check echo  RN BP check 2 weeks (Dr. Petit), titrate amlod +/- add ARB/HCTZ as next agents  RTC 6 months with lipids/LFT (July 2024)    Jagdish Patel MD, FACC

## 2024-04-18 ENCOUNTER — PATIENT OUTREACH (OUTPATIENT)
Dept: ADMINISTRATIVE | Facility: HOSPITAL | Age: 64
End: 2024-04-18
Payer: COMMERCIAL

## 2024-10-30 DIAGNOSIS — I10 ESSENTIAL HYPERTENSION: ICD-10-CM

## 2025-02-07 ENCOUNTER — PATIENT MESSAGE (OUTPATIENT)
Dept: PODIATRY | Facility: CLINIC | Age: 65
End: 2025-02-07
Payer: COMMERCIAL

## 2025-02-11 ENCOUNTER — OFFICE VISIT (OUTPATIENT)
Dept: PODIATRY | Facility: CLINIC | Age: 65
End: 2025-02-11
Payer: COMMERCIAL

## 2025-02-11 VITALS
HEART RATE: 73 BPM | WEIGHT: 225.06 LBS | HEIGHT: 75 IN | BODY MASS INDEX: 27.98 KG/M2 | SYSTOLIC BLOOD PRESSURE: 203 MMHG | DIASTOLIC BLOOD PRESSURE: 112 MMHG

## 2025-02-11 DIAGNOSIS — M21.42 PES PLANUS OF BOTH FEET: ICD-10-CM

## 2025-02-11 DIAGNOSIS — M79.671 CHRONIC PAIN OF BOTH FEET: ICD-10-CM

## 2025-02-11 DIAGNOSIS — R20.0 NUMBNESS IN FEET: ICD-10-CM

## 2025-02-11 DIAGNOSIS — R26.9 GAIT ABNORMALITY: ICD-10-CM

## 2025-02-11 DIAGNOSIS — M79.672 CHRONIC PAIN OF BOTH FEET: ICD-10-CM

## 2025-02-11 DIAGNOSIS — G89.29 CHRONIC PAIN OF BOTH FEET: ICD-10-CM

## 2025-02-11 DIAGNOSIS — R29.898 WEAKNESS OF BOTH LEGS: Primary | ICD-10-CM

## 2025-02-11 DIAGNOSIS — M21.41 PES PLANUS OF BOTH FEET: ICD-10-CM

## 2025-02-11 PROCEDURE — 99204 OFFICE O/P NEW MOD 45 MIN: CPT | Mod: S$GLB,,, | Performed by: STUDENT IN AN ORGANIZED HEALTH CARE EDUCATION/TRAINING PROGRAM

## 2025-02-11 PROCEDURE — 3080F DIAST BP >= 90 MM HG: CPT | Mod: CPTII,S$GLB,, | Performed by: STUDENT IN AN ORGANIZED HEALTH CARE EDUCATION/TRAINING PROGRAM

## 2025-02-11 PROCEDURE — 99999 PR PBB SHADOW E&M-EST. PATIENT-LVL III: CPT | Mod: PBBFAC,,, | Performed by: STUDENT IN AN ORGANIZED HEALTH CARE EDUCATION/TRAINING PROGRAM

## 2025-02-11 PROCEDURE — 3008F BODY MASS INDEX DOCD: CPT | Mod: CPTII,S$GLB,, | Performed by: STUDENT IN AN ORGANIZED HEALTH CARE EDUCATION/TRAINING PROGRAM

## 2025-02-11 PROCEDURE — 3077F SYST BP >= 140 MM HG: CPT | Mod: CPTII,S$GLB,, | Performed by: STUDENT IN AN ORGANIZED HEALTH CARE EDUCATION/TRAINING PROGRAM

## 2025-02-11 PROCEDURE — 1159F MED LIST DOCD IN RCRD: CPT | Mod: CPTII,S$GLB,, | Performed by: STUDENT IN AN ORGANIZED HEALTH CARE EDUCATION/TRAINING PROGRAM

## 2025-02-11 PROCEDURE — 1126F AMNT PAIN NOTED NONE PRSNT: CPT | Mod: CPTII,S$GLB,, | Performed by: STUDENT IN AN ORGANIZED HEALTH CARE EDUCATION/TRAINING PROGRAM

## 2025-02-11 RX ORDER — DEXAMETHASONE 4 MG/1
4 TABLET ORAL DAILY
Qty: 7 TABLET | Refills: 0 | Status: SHIPPED | OUTPATIENT
Start: 2025-02-11

## 2025-02-11 NOTE — PROGRESS NOTES
Subjective:     Patient    Inessa Lindo is a 64 y.o. male.    Problem    Last seen by Dr Ambrose and Dr Walters 10 years ago for right foot pain and unsteadiness. Returns with bilateral diffuse foot pain, ankle numbness, weakness in both legs with difficulty walking. Present for years, worsening. Would like to try inserts but [facility] was too expensive. Attempted PT in the past but only focused directly on the feet and results were limited. Does have some knee pain. Denies back issues.      History    History obtained from patient and review of medical records.     Past Medical History:   Diagnosis Date    Hypertension        Past Surgical History:   Procedure Laterality Date    KNEE SURGERY          Objective:     Vitals  Wt Readings from Last 1 Encounters:   02/11/25 102.1 kg (225 lb 1.4 oz)     Temp Readings from Last 1 Encounters:   10/04/23 98.2 °F (36.8 °C) (Oral)     BP Readings from Last 1 Encounters:   02/11/25 (!) 203/112     Pulse Readings from Last 1 Encounters:   02/11/25 73       Dermatological Exam    Skin:  Pedal hair growth, skin color, and skin texture normal on right  Pedal hair growth, skin color, and skin texture normal on left    Nails:  All nails normal in length, thickness, color    Vascular Exam    Arteries:  Posterior tibial artery palpable on right  Dorsalis pedis artery palpable on right  Posterior tibial artery palpable on left  Dorsalis pedis artery palpable on left    Veins:  Superficial veins unremarkable on right  Superficial veins unremarkable on left    Swelling:  None on right  None on left    Neurological Exam    [place] touch test:  6/6 sites sensed, light touch intact    Provocation Sign:  + at tibial nerve and sural nerve bilaterally     Musculoskeletal Exam    Footwear:  Athletic on right  Athletic on left    Gait Exam:   Ambulatory Status: Ambulatory  Gait: Antalgic, apropulsive, asymmetrical  Assistive Devices: None    Foot Progression Angle:  Normal on  right  Normal on left    Right Lower Extremity Additional Findings:  Pes planus  Diffuse RLE weakness, 4-/5  Right foot and ankle function, strength, and range of motion unremarkable except as noted above.     Left Lower Extremity Additional Findings:  Pes planus  Diffuse RLE weakness, 4-/5  Left foot and ankle function, strength, and range of motion unremarkable except as noted above.    Imaging and Other Tests    Imaging:  Independently reviewed and interpreted imaging, findings are as follows: N/A     Assessment:     Encounter Diagnoses   Name Primary?    Weakness of both legs Yes    Numbness in feet     Chronic pain of both feet     Gait abnormality     Pes planus of both feet         Plan:     I counseled the patient on his conditions, their implications and medical management.    Bilateral pes planus, BLE weakness, gait abnormality, pain, numbness: chronic exacerbated  -Suspect primarily spinal origin for diffuse weakness, sensitivity/pain, numbness.  -PO dexamethasone.   -PT.   -Custom foot orthoses.     Return to clinic in 1.5 months, call sooner PRN.

## 2025-02-13 ENCOUNTER — CLINICAL SUPPORT (OUTPATIENT)
Dept: REHABILITATION | Facility: HOSPITAL | Age: 65
End: 2025-02-13
Attending: STUDENT IN AN ORGANIZED HEALTH CARE EDUCATION/TRAINING PROGRAM
Payer: COMMERCIAL

## 2025-02-13 DIAGNOSIS — R26.9 GAIT ABNORMALITY: ICD-10-CM

## 2025-02-13 DIAGNOSIS — G89.29 CHRONIC PAIN OF BOTH FEET: ICD-10-CM

## 2025-02-13 DIAGNOSIS — M79.671 CHRONIC PAIN OF BOTH FEET: ICD-10-CM

## 2025-02-13 DIAGNOSIS — M79.672 CHRONIC PAIN OF BOTH FEET: ICD-10-CM

## 2025-02-13 DIAGNOSIS — R29.898 WEAKNESS OF BOTH LEGS: ICD-10-CM

## 2025-02-13 DIAGNOSIS — R29.898 WEAKNESS OF BOTH LOWER EXTREMITIES: Primary | ICD-10-CM

## 2025-02-13 PROCEDURE — 97161 PT EVAL LOW COMPLEX 20 MIN: CPT

## 2025-02-13 PROCEDURE — 97110 THERAPEUTIC EXERCISES: CPT

## 2025-02-13 NOTE — PROGRESS NOTES
Outpatient Rehab    Physical Therapy Evaluation    Patient Name: Inessa Lindo  MRN: [id number]  YOB: 1960  Today's Date: 2/13/2025    Therapy Diagnosis:   Encounter Diagnoses   Name Primary?    Weakness of both legs     Chronic pain of both feet     Gait abnormality     Weakness of both lower extremities Yes     Physician: Lalo Elizabeth DPM    Physician Orders: Eval and Treat  Medical Diagnosis: Weakness of both legs [R29.898], Chronic pain of both feet [M79.671, G89.29, M79.672], Gait abnormality [R26.9]    Visit # / Visits Authorized:  1 / 1   Date of Evaluation:  2/13/2025   Insurance Authorization Period:  to [facility]  Plan of Care Certification:  2/13/2025 to 4/13/2025      Time In: 0800   Time Out: 0855  Total Time: 55   Total Billable Time: 55 Minutes     Intake Outcome Measure for FOTO Survey    Therapist reviewed FOTO scores for Inessa Lindo on 2/13/2025.   FOTO report - see Media section or FOTO account episode details.     Intake Score:  %         Subjective   History of Present Illness  [place] is a 64 y.o. male who reports to physical therapy with a chief concern of Weakness in the legs and poor gait. According to the patient's chart, [place] has a past medical history of Hypertension. [place] has a past surgical history that includes Knee surgery.    The patient reports a medical diagnosis of Weakness of both legs, Chronic pain of both feet, Gait abnormality.    Diagnostic tests related to this condition: MRI studies.   MRI Studies Details: 1.   Moderate to severe central canal stenosis at L3-L4.  There is aggregation of the cauda equina above this level stenosis. 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 2.   Moderate central canal stenosis at L4-L5. 3.   Severe neuroforaminal narrowing at the following levels: LEFT L3-L4. 4.   Moderate to severe neuroforaminal narrowing at the following levels: Bilateral L4-L5 5.   Nonaggressive 1.3 cm bony  lesion at L4.  Differential considerations include atypical hemangioma versus Schmorl's node.    History of Present Condition/Illness: Patient presents to PT with difficulty walking and weakness in the legs. Patient reports both feet get numb and cold on occasion. Patient reports difficulty flexing is foot and toes down. Patient cannot stand on tip toes, but he can stand on his heels. Patient reports he has a history of back issues, but he has no back pain. Patient has had a previous MRI on his back. Patient has not fallen due to this. Patient does shift work on barges, Patient is climbing ladders, walking, and driving at work. Patient has no limitations at work due to his weakness in legs. Patient reports outside of work he does house work, yard work, and washes cars.     Activities of Daily Living  Social history was obtained from Patient.               Previously independent with activities of daily living? Yes     Currently independent with activities of daily living? Yes          Previously independent with instrumental activities of daily living? Yes     Currently independent with instrumental activities of daily living? Yes              Pain  No Pain Reported: Yes                 Employment  Patient does not report that: Does the patient's condition impact their ability to work?  Employment Status: Employed full-time          Past Medical History/Physical Systems Review:   Inessa Lindo  has a past medical history of Hypertension.    Inessa Lindo  has a past surgical history that includes Knee surgery.    [place] has a current medication list which includes the following prescription(s): amlodipine, atorvastatin, chlorhexidine, and dexamethasone.    Review of patient's allergies indicates:  No Known Allergies     Objective      Spinal Mobility  Hypomobile: Lumbosacral           Lumbar Range of Motion   Active (deg) Passive (deg) Pain   Flexion  (WNL)       Extension  (WNL)       Right Lateral Flexion   (WNL)       Right Rotation  (WNL)       Left Lateral Flexion  (WNL)       Left Rotation  (WNL)                           Ankle/Foot Strength - Planes of Motion   Right Strength Right Pain Left Strength Left  Pain   Dorsiflexion (L4) 4   4     Plantar Flexion (S1) 1   1     Inversion 3-   3-     Eversion 3-   3-     Great Toe Flexion 1   1     Great Toe Extension (L5) 3-   3-     Lesser Toes Flexion           Lesser Toes Extension                     Four Stage Balance Test  Narrow Base of Support: 10 sec  Tandem Stand - Right Foot in Front: 4 sec  Tandem Stand - Left Foot in Front: 8 sec  Semi-Tandem Stand - Right Foot in Front: 10 sec  Semi-Tandem Stand - Left Foot in Front: 10 sec  Single Leg Stand - Right Foot: 2 sec  Single Leg Stand - Left Foot: 2 sec       Gait Analysis  Gait Analysis Details  Patient ambulates with reduced toe off and ambulates more so in inversion.            L/E Strength w/ MicroFET Muscle Ana Dynamometer Right Left Pain/Dysfunction with Movement   (approx 4 sec hold w/ max contraction)   Hip Flexion 32.7 kg  30.3 kg     Hip Abduction 10.2 kg  13.5 kg     Quadriceps 46 kg  51 kg     Hamstrings 11 kg  12.5 kg           Treatment:  Therapeutic Exercise  Therapeutic Exercise Activity 1: Clam with Resistance  - 2 x daily - 7 x weekly - 3 sets - 10 reps  - Supine Bridge with Resistance Band  - 2 x daily - 7 x weekly - 3 sets - 10 reps  - Supine Active Straight Leg Raise  - 2 x daily - 7 x weekly - 3 sets - 10 reps  - Supine Ankle Dorsiflexion and Plantarflexion AROM  - 2 x daily - 7 x weekly - 30 reps  - Supine Ankle Inversion Eversion AROM  - 2 x daily - 7 x weekly - 30 reps    Manual Therapy  Manual Therapy Activity 1: Next session trial kinesiotaping to bilateral feet to pull feet into eversion    Assessment & Plan   Assessment  [place] presents with a condition of Low complexity.   Presentation of Symptoms: Stable       Impairments: Abnormal gait, Abnormal muscle firing, Impaired  physical strength    Patient Goal for Therapy (PT): To get stronger and improve gait  Prognosis: Fair  Assessment Details: Patient presents to PT with a chronic history of lower ex weakness from the hips down to his feet. Patient presents with weakness bilaterally in the legs, poor gait, and decreased balance. Patient has a history of significant lower back conditions that could be affecting the strength and motor control of his legs. Patient was progressed today with TE to initiate a home program to address leg weakness and motor control of lower legs. Patient had no adverse effects from today. Patient would benefit from trial of kinesiotaping in the future to feet.     Plan  From a physical therapy perspective, the patient would benefit from: Skilled Rehab Services    Planned therapy interventions include: Therapeutic exercise, Therapeutic activities, Neuromuscular re-education, Manual therapy, ADLs/IADLs, and Gait training.            Visit Frequency: 2 times Per Week for 6 Weeks.       This plan was discussed with Patient.   Discussion participants: Agreed Upon Plan of Care             Patient's spiritual, cultural, and educational needs considered and patient agreeable to plan of care and goals.     Education  Education was done with Patient. The patient's learning style includes Demonstration, Pictures/video, and Listening. The patient Demonstrates understanding.         Education on condition and treatment / exercises        Goals:   Active       Functional outcome       Patient stated goal: To get stronger and improve gait        Start:  02/13/25    Expected End:  04/13/25            Patient will demonstrate independence in home program for support of progression       Start:  02/13/25    Expected End:  04/13/25               Strength       Patient will improve bilateral LE strength by 10-20% measured by HHD to improve gait        Start:  02/13/25    Expected End:  03/13/25            Patient will achieve  bilateral strength by 20-30% measured by HHD to improve gait       Start:  02/13/25    Expected End:  04/13/25               Strength       Patient will achieve bilateral ankle plantar flexion strength of 2/5 to improve gait       Start:  02/13/25    Expected End:  04/13/25            Patient will achieve bilateral ankle eversion and inversion strength of 3+/5 to improve gait       Start:  02/13/25    Expected End:  04/13/25                Mac Laird PT, [email or web address]

## 2025-02-13 NOTE — PATIENT INSTRUCTIONS
Access Code: [email or web address]  URL: [email or web address]  Date: 02/13/2025  Prepared by: Mac Laird    Exercises  - Clam with Resistance  - 2 x daily - 7 x weekly - 3 sets - 10 reps  - Supine Bridge with Resistance Band  - 2 x daily - 7 x weekly - 3 sets - 10 reps  - Supine Active Straight Leg Raise  - 2 x daily - 7 x weekly - 3 sets - 10 reps  - Supine Ankle Dorsiflexion and Plantarflexion AROM  - 2 x daily - 7 x weekly - 30 reps  - Supine Ankle Inversion Eversion AROM  - 2 x daily - 7 x weekly - 30 reps

## 2025-02-18 ENCOUNTER — TELEPHONE (OUTPATIENT)
Dept: PODIATRY | Facility: CLINIC | Age: 65
End: 2025-02-18
Payer: COMMERCIAL

## 2025-02-18 ENCOUNTER — CLINICAL SUPPORT (OUTPATIENT)
Dept: REHABILITATION | Facility: HOSPITAL | Age: 65
End: 2025-02-18
Attending: STUDENT IN AN ORGANIZED HEALTH CARE EDUCATION/TRAINING PROGRAM
Payer: COMMERCIAL

## 2025-02-18 ENCOUNTER — PATIENT MESSAGE (OUTPATIENT)
Dept: PODIATRY | Facility: CLINIC | Age: 65
End: 2025-02-18
Payer: COMMERCIAL

## 2025-02-18 DIAGNOSIS — G89.29 CHRONIC PAIN OF BOTH FEET: ICD-10-CM

## 2025-02-18 DIAGNOSIS — R29.898 WEAKNESS OF BOTH LOWER EXTREMITIES: Primary | ICD-10-CM

## 2025-02-18 DIAGNOSIS — M79.671 CHRONIC PAIN OF BOTH FEET: ICD-10-CM

## 2025-02-18 DIAGNOSIS — M79.672 CHRONIC PAIN OF BOTH FEET: ICD-10-CM

## 2025-02-18 PROCEDURE — 97112 NEUROMUSCULAR REEDUCATION: CPT | Mod: CQ

## 2025-02-18 PROCEDURE — 97530 THERAPEUTIC ACTIVITIES: CPT | Mod: CQ

## 2025-02-18 PROCEDURE — 97110 THERAPEUTIC EXERCISES: CPT | Mod: CQ

## 2025-02-18 RX ORDER — DEXAMETHASONE 4 MG/1
4 TABLET ORAL DAILY
Qty: 14 TABLET | Refills: 0 | Status: SHIPPED | OUTPATIENT
Start: 2025-02-18

## 2025-02-18 NOTE — PROGRESS NOTES
"  Outpatient Rehab    Physical Therapy Visit    Patient Name: Inessa Lindo  MRN: [id number]  YOB: 1960  Today's Date: 2/18/2025    Therapy Diagnosis:   Encounter Diagnosis   Name Primary?    Weakness of both lower extremities Yes     Physician: Lalo Elizabeth DPM    Physician Orders: Eval and Treat  Medical Diagnosis:        Time In: 1549   Time Out: 1650  Total Time: 61   Total Billable Time: 41 (some time not billed d/t medicare billing practices)     FOTO:  Intake Score:  %  Survey Score 1:  %  Survey Score 2:  %         Subjective   Pt states that if he could get his legs working good he's be "alright"..         Objective            Treatment:  Therapeutic Exercise  Therapeutic Exercise Activity 1: Active ankle DF/PF and Invrs/Evrs 3x10  Therapeutic Exercise Activity 2: SLR 3x30 BLE  Therapeutic Exercise Activity 3: Bridges c/ band 3x10  Therapeutic Exercise Activity 4: Sidelying clams 3x10  Therapeutic Exercise Activity 5: Standing hip abd  Therapeutic Exercise Activity 6: Seated HS curls c/ GTB         Balance/Neuromuscular Re-Education  Balance/Neuromuscular Re-Education Activity 1: TrA brace c/ SB isometric 3" 2x10 3 planes  Balance/Neuromuscular Re-Education Activity 2: R ankle DF/PF and inv/evrs c/ fitter board 2x10  Balance/Neuromuscular Re-Education Activity 3: Anterior tib push ups @ wall 3x10    Therapeutic Activity  Therapeutic Activity 1: Standing hip abd/ext GTB 2x15 BLE  Therapeutic Activity 2: STS c/ 10lb chest press 2x10    Assessment & Plan   Assessment: Initated therapy program following initial evaluation. Incorporated actvities for improved core stabilization, as well as NMR for bilateral LE. Pt exhibited decreased dorsiflexion activation bilaterally.       Patient will continue to benefit from skilled outpatient physical therapy to address the deficits listed in the problem list box on initial evaluation, provide pt/family education and to maximize pt's level of " independence in the home and community environment.     Patient's spiritual, cultural, and educational needs considered and patient agreeable to plan of care and goals.           Plan: Incorporate double and single leg balance. GSS on wedge    Goals:   Active       Functional outcome       Patient stated goal: To get stronger and improve gait  (Progressing)       Start:  02/13/25    Expected End:  04/13/25            Patient will demonstrate independence in home program for support of progression (Progressing)       Start:  02/13/25    Expected End:  04/13/25               Strength       Patient will improve bilateral LE strength by 10-20% measured by HHD to improve gait  (Progressing)       Start:  02/13/25    Expected End:  03/13/25            Patient will achieve bilateral strength by 20-30% measured by HHD to improve gait (Progressing)       Start:  02/13/25    Expected End:  04/13/25               Strength       Patient will achieve bilateral ankle plantar flexion strength of 2/5 to improve gait (Progressing)       Start:  02/13/25    Expected End:  04/13/25            Patient will achieve bilateral ankle eversion and inversion strength of 3+/5 to improve gait (Progressing)       Start:  02/13/25    Expected End:  04/13/25                Tianna Pope PTA

## 2025-02-25 ENCOUNTER — CLINICAL SUPPORT (OUTPATIENT)
Dept: REHABILITATION | Facility: HOSPITAL | Age: 65
End: 2025-02-25
Payer: COMMERCIAL

## 2025-02-25 DIAGNOSIS — R29.898 WEAKNESS OF BOTH LOWER EXTREMITIES: Primary | ICD-10-CM

## 2025-02-25 PROCEDURE — 97112 NEUROMUSCULAR REEDUCATION: CPT | Mod: CQ

## 2025-02-25 PROCEDURE — 97530 THERAPEUTIC ACTIVITIES: CPT | Mod: CQ

## 2025-02-25 PROCEDURE — 97110 THERAPEUTIC EXERCISES: CPT | Mod: CQ

## 2025-02-25 NOTE — PROGRESS NOTES
"  Outpatient Rehab    Physical Therapy Visit    Patient Name: Inessa Lindo  MRN: [id number]  YOB: 1960  Today's Date: 2/25/2025    Therapy Diagnosis:   Encounter Diagnosis   Name Primary?    Weakness of both lower extremities Yes       Physician: Lalo Elizabeth DPM    Physician Orders: Eval and Treat  Medical Diagnosis: Weakness of both legs [R29.898], Chronic pain of both feet [M79.671, G89.29, M79.672], Gait abnormality [R26.9]        Time In: 1602   Time Out: 1707  Total Time: 65   Total Billable Time: 65    FOTO:  Intake Score:  %  Survey Score 1:  %  Survey Score 2:  %         Subjective   Pt states that he feels like he is walking better but still gets intermittent tingling in his foot and toes..         Objective            Treatment:  Therapeutic Exercise  Therapeutic Exercise Activity 1: Active ankle DF/PF and Invrs/Evrs 3x10  Therapeutic Exercise Activity 2: SLR 3x10 BLE  Therapeutic Exercise Activity 3: Bridges c/ GTband 3x10  Therapeutic Exercise Activity 4: Sidelying clams GTB 3x10  Therapeutic Exercise Activity 6: Seated HS curls c/ GTB 3x10 BLE         Balance/Neuromuscular Re-Education  Balance/Neuromuscular Re-Education Activity 1: TrA brace c/ SB isometric 3" 2x10 3 planes  Balance/Neuromuscular Re-Education Activity 2: R ankle DF/PF and inv/evrs c/ fitter board 2x10  Balance/Neuromuscular Re-Education Activity 3: Anterior tib push ups @ wall 3x10  Balance/Neuromuscular Re-Education Activity 4: pallof press DBL BTB 2x10    Therapeutic Activity  Therapeutic Activity 1: Standing hip abd/ext GTB 2x15 BLE  Therapeutic Activity 2: STS c/ 10lb chest press 2x10  Therapeutic Activity 3: Nu-step 7 min supervised (for reciprocal UE/LE to improve ambulation at work)    Assessment & Plan   Assessment: Continued with core strengthening with the addition of pallof press. Pt tolerated this well with minimal cuing. Pt continues with weakness in bilateral anterior tibalis, possibly compensating " with great toe extension due to amount of supination during dorsiflexion.  Evaluation/Treatment Tolerance: Patient tolerated treatment well    Patient will continue to benefit from skilled outpatient physical therapy to address the deficits listed in the problem list box on initial evaluation, provide pt/family education and to maximize pt's level of independence in the home and community environment.     Patient's spiritual, cultural, and educational needs considered and patient agreeable to plan of care and goals.           Plan: Incorporate double and single leg balance. GSS on wedge    Goals:   Active       Functional outcome       Patient stated goal: To get stronger and improve gait  (Progressing)       Start:  02/13/25    Expected End:  04/13/25            Patient will demonstrate independence in home program for support of progression (Progressing)       Start:  02/13/25    Expected End:  04/13/25               Strength       Patient will improve bilateral LE strength by 10-20% measured by HHD to improve gait  (Progressing)       Start:  02/13/25    Expected End:  03/13/25            Patient will achieve bilateral strength by 20-30% measured by HHD to improve gait (Progressing)       Start:  02/13/25    Expected End:  04/13/25               Strength       Patient will achieve bilateral ankle plantar flexion strength of 2/5 to improve gait (Progressing)       Start:  02/13/25    Expected End:  04/13/25            Patient will achieve bilateral ankle eversion and inversion strength of 3+/5 to improve gait (Progressing)       Start:  02/13/25    Expected End:  04/13/25                Tianna Pope PTA

## 2025-03-13 ENCOUNTER — CLINICAL SUPPORT (OUTPATIENT)
Dept: REHABILITATION | Facility: HOSPITAL | Age: 65
End: 2025-03-13
Payer: COMMERCIAL

## 2025-03-13 DIAGNOSIS — R29.898 WEAKNESS OF BOTH LOWER EXTREMITIES: Primary | ICD-10-CM

## 2025-03-13 PROCEDURE — 97110 THERAPEUTIC EXERCISES: CPT

## 2025-03-13 PROCEDURE — 97112 NEUROMUSCULAR REEDUCATION: CPT

## 2025-03-13 PROCEDURE — 97530 THERAPEUTIC ACTIVITIES: CPT

## 2025-03-13 NOTE — PROGRESS NOTES
"  Outpatient Rehab    Physical Therapy Visit    Patient Name: Inessa Lindo  MRN: [id number]  YOB: 1960  Today's Date: 3/13/2025    Therapy Diagnosis:   Encounter Diagnosis   Name Primary?    Weakness of both lower extremities Yes       Physician: Lalo Elizabeth DPM    Physician Orders: Eval and Treat  Medical Diagnosis: Weakness of both legs [R29.898], Chronic pain of both feet [M79.671, G89.29, M79.672], Gait abnormality [R26.9]        Time In: 1025   Time Out: 1120  Total Time: 55   Total Billable Time: 55  Minutes    FOTO:  Intake Score:  %  Survey Score 1:  %  Survey Score 2:  %         Subjective   Patient is doing good. Patient has been paritally compliant with home program. Patient continues to get tingling in feet.         Objective            Treatment:    THERAPEUTIC EXERCISES to develop strength, endurance, ROM, and flexibility for 10 minutes including     SLR 3x10 BLE  Bridges c/ GTband 3x10  Sidelying clams GTB 3x10  Prone HS curls c/ 5# 3x10 BLE    NEUROMUSCULAR RE-EDUCATION ACTIVITIES to improve Balance, Coordination, Kinesthetic, Sense, Proprioception, and Motor Control for 20 minutes.  The following were included:      Anterior tib push ups @ wall 3x10  pallof press DBL BTB 2x10  Single leg Balance   Balance   - feet together 30s x 5 on foam  - tandem 30s x 3 ea way on ground       Not today  TrA brace c/ SB isometric 3" 2x10 3 planes  R ankle DF/PF and inv/evrs c/ fitter board 2x10        THERAPEUTIC ACTIVITIES to improve dynamic and functional performance for 25 minutes including       Standing hip abd/ext 5# 2x15 BL  Shuttle double leg press 3.5c 3x10  Shuttle single leg press 3c 3c10   STS c/ 10lb chest press 2x10  Nu-step 7 min supervised (for reciprocal UE/LE to improve ambulation at work)                   Assessment & Plan   Assessment: Progressed patient today with more weighted exercsies and also balance exercsies. Patient has done well with progressions. Patient is going " to schedule more visits due to missing a few recently.  Evaluation/Treatment Tolerance: Patient tolerated treatment well    Patient will continue to benefit from skilled outpatient physical therapy to address the deficits listed in the problem list box on initial evaluation, provide pt/family education and to maximize pt's level of independence in the home and community environment.     Patient's spiritual, cultural, and educational needs considered and patient agreeable to plan of care and goals.           Plan: Cont to progress when able    Goals:   Active       Functional outcome       Patient stated goal: To get stronger and improve gait  (Progressing)       Start:  02/13/25    Expected End:  04/13/25            Patient will demonstrate independence in home program for support of progression (Progressing)       Start:  02/13/25    Expected End:  04/13/25               Strength       Patient will improve bilateral LE strength by 10-20% measured by HHD to improve gait  (Progressing)       Start:  02/13/25    Expected End:  03/13/25            Patient will achieve bilateral strength by 20-30% measured by HHD to improve gait (Progressing)       Start:  02/13/25    Expected End:  04/13/25               Strength       Patient will achieve bilateral ankle plantar flexion strength of 2/5 to improve gait (Progressing)       Start:  02/13/25    Expected End:  04/13/25            Patient will achieve bilateral ankle eversion and inversion strength of 3+/5 to improve gait (Progressing)       Start:  02/13/25    Expected End:  04/13/25                Mac Laird, PT, DPT

## 2025-03-18 ENCOUNTER — CLINICAL SUPPORT (OUTPATIENT)
Dept: REHABILITATION | Facility: HOSPITAL | Age: 65
End: 2025-03-18
Payer: COMMERCIAL

## 2025-03-18 DIAGNOSIS — R29.898 WEAKNESS OF BOTH LOWER EXTREMITIES: Primary | ICD-10-CM

## 2025-03-18 PROCEDURE — 97112 NEUROMUSCULAR REEDUCATION: CPT

## 2025-03-18 PROCEDURE — 97110 THERAPEUTIC EXERCISES: CPT

## 2025-03-18 PROCEDURE — 97530 THERAPEUTIC ACTIVITIES: CPT

## 2025-03-18 NOTE — PROGRESS NOTES
"  Outpatient Rehab    Physical Therapy Visit    Patient Name: Inessa Lindo  MRN: [id number]  YOB: 1960  Today's Date: 3/18/2025    Therapy Diagnosis:   Encounter Diagnosis   Name Primary?    Weakness of both lower extremities Yes         Physician: Lalo Elizabeth DPM    Physician Orders: Eval and Treat  Medical Diagnosis: Weakness of both legs [R29.898], Chronic pain of both feet [M79.671, G89.29, M79.672], Gait abnormality [R26.9]        Time In: 1000   Time Out: 1055  Total Time: 55   Total Billable Time: 55  Minutes    FOTO:  Intake Score:  %  Survey Score 1:  %  Survey Score 2:  %         Subjective   Patient has noticed improvement with his walking and feels better overall. Patient is happy with progress.  Pain reported as 0/10.      Objective            Treatment:    THERAPEUTIC EXERCISES to develop strength, endurance, ROM, and flexibility for 8 minutes including     SLR 3x10 BLE  Bridges c/ BlueTband 3x10  Sidelying clams GTB 3x10    NEUROMUSCULAR RE-EDUCATION ACTIVITIES to improve Balance, Coordination, Kinesthetic, Sense, Proprioception, and Motor Control for 24 minutes.  The following were included:      Anterior tib push ups @ wall 3x10  pallof press DBL BTB 2x10  Single leg Balance   Balance   - feet together 30s x 5 on foam  - tandem 30s x 3 ea way on ground   Single leg balance with cable assist 3# both hands  - 30s x 3 ea leg       Not today  TrA brace c/ SB isometric 3" 2x10 3 planes  R ankle DF/PF and inv/evrs c/ fitter board 2x10        THERAPEUTIC ACTIVITIES to improve dynamic and functional performance for 23 minutes including       Standing hip abd/ext 5# 3x10 BL  Shuttle double leg press 4c 3x10  Shuttle single leg press 3c 3c10   STS c/ 10lb chest press 2x10  Nu-step 7 min supervised (for reciprocal UE/LE to improve ambulation at work)                   Assessment & Plan   Assessment: Patient continued to be progressed today with weighted exercsies, balance, and functional " exercises. Patient is doing well overall and will continue to be progressed.  Evaluation/Treatment Tolerance: Patient tolerated treatment well    Patient will continue to benefit from skilled outpatient physical therapy to address the deficits listed in the problem list box on initial evaluation, provide pt/family education and to maximize pt's level of independence in the home and community environment.     Patient's spiritual, cultural, and educational needs considered and patient agreeable to plan of care and goals.           Plan: Cont to progress when able    Goals:   Active       Functional outcome       Patient stated goal: To get stronger and improve gait  (Progressing)       Start:  02/13/25    Expected End:  04/13/25            Patient will demonstrate independence in home program for support of progression (Progressing)       Start:  02/13/25    Expected End:  04/13/25               Strength       Patient will improve bilateral LE strength by 10-20% measured by HHD to improve gait  (Progressing)       Start:  02/13/25    Expected End:  03/13/25            Patient will achieve bilateral strength by 20-30% measured by HHD to improve gait (Progressing)       Start:  02/13/25    Expected End:  04/13/25               Strength       Patient will achieve bilateral ankle plantar flexion strength of 2/5 to improve gait (Progressing)       Start:  02/13/25    Expected End:  04/13/25            Patient will achieve bilateral ankle eversion and inversion strength of 3+/5 to improve gait (Progressing)       Start:  02/13/25    Expected End:  04/13/25                Mac Laird, PT, DPT

## 2025-03-19 ENCOUNTER — TELEPHONE (OUTPATIENT)
Dept: PODIATRY | Facility: CLINIC | Age: 65
End: 2025-03-19
Payer: COMMERCIAL

## 2025-03-19 NOTE — TELEPHONE ENCOUNTER
"Patient stated he will drop off paperwork on 03/20/2025 for Dr. Elizabeth(Podiatry) to complete          ----- Message from Anitha sent at 3/19/2025  8:17 AM CDT -----  Regarding: Consult Advisory (Handicap Sticker Document )  Contact: [place]  CONSULT/ADVISORYName of Caller: [place] Contact Preference:  [phone] of Call: Pt needs Provider to fill out a document regarding his handicap sticker. He would like to know the best time for him to drop it off and receive it back. He is waiting on renewing his License due to this. Would like a call back to create a plan of action. "Doesn't use the Portal that much "  "

## 2025-03-20 ENCOUNTER — CLINICAL SUPPORT (OUTPATIENT)
Dept: REHABILITATION | Facility: HOSPITAL | Age: 65
End: 2025-03-20
Payer: COMMERCIAL

## 2025-03-20 DIAGNOSIS — R29.898 WEAKNESS OF BOTH LOWER EXTREMITIES: Primary | ICD-10-CM

## 2025-03-20 PROCEDURE — 97110 THERAPEUTIC EXERCISES: CPT

## 2025-03-20 PROCEDURE — 97530 THERAPEUTIC ACTIVITIES: CPT

## 2025-03-20 PROCEDURE — 97112 NEUROMUSCULAR REEDUCATION: CPT

## 2025-03-20 NOTE — PROGRESS NOTES
"  Outpatient Rehab    Physical Therapy Visit    Patient Name: Inessa Lindo  MRN: [id number]  YOB: 1960  Today's Date: 3/20/2025    Therapy Diagnosis:   Encounter Diagnosis   Name Primary?    Weakness of both lower extremities Yes         Physician: Lalo Elizabeth DPM    Physician Orders: Eval and Treat  Medical Diagnosis: Weakness of both legs [R29.898], Chronic pain of both feet [M79.671, G89.29, M79.672], Gait abnormality [R26.9]        Time In: 0800   Time Out: 0855  Total Time: 55   Total Billable Time: 55  Minutes    FOTO:  Intake Score:  %  Survey Score 1:  %  Survey Score 2:  %         Subjective   Patient has noticed improvement with his walking and feels better overall. Patient is happy with progress.         Objective            Treatment:    THERAPEUTIC EXERCISES to develop strength, endurance, ROM, and flexibility for 8 minutes including     SLR 3x10 BLE  Bridges c/ BlueTband 3x10  Sidelying clams GTB 3x10    NEUROMUSCULAR RE-EDUCATION ACTIVITIES to improve Balance, Coordination, Kinesthetic, Sense, Proprioception, and Motor Control for 24 minutes.  The following were included:      Anterior tib push ups @ wall 3x10  pallof press DBL BTB 2x10  Single leg Balance   Balance   - feet together 30s x 5 on foam  - tandem 30s x 3 ea way on ground   Single leg balance with cable assist 3# both hands  - 30s x 3 ea leg       Not today  TrA brace c/ SB isometric 3" 2x10 3 planes  R ankle DF/PF and inv/evrs c/ fitter board 2x10        THERAPEUTIC ACTIVITIES to improve dynamic and functional performance for 23 minutes including       Standing hip abd/ext 5# 3x10 BL  Shuttle double leg press 4c 3x10  Shuttle single leg press 3c 3c10   STS c/ 10lb chest press 2x10  Nu-step 7 min supervised (for reciprocal UE/LE to improve ambulation at work)                   Assessment & Plan   Assessment: Patient continued to be progressed today with weighted exercsies, balance, and functional exercises. Patient is " doing well overall and will continue to be progressed.  Evaluation/Treatment Tolerance: Patient tolerated treatment well    Patient will continue to benefit from skilled outpatient physical therapy to address the deficits listed in the problem list box on initial evaluation, provide pt/family education and to maximize pt's level of independence in the home and community environment.     Patient's spiritual, cultural, and educational needs considered and patient agreeable to plan of care and goals.           Plan: Cont to progress when able    Goals:   Active       Functional outcome       Patient stated goal: To get stronger and improve gait  (Progressing)       Start:  02/13/25    Expected End:  04/13/25            Patient will demonstrate independence in home program for support of progression (Progressing)       Start:  02/13/25    Expected End:  04/13/25               Strength       Patient will improve bilateral LE strength by 10-20% measured by HHD to improve gait  (Progressing)       Start:  02/13/25    Expected End:  03/13/25            Patient will achieve bilateral strength by 20-30% measured by HHD to improve gait (Progressing)       Start:  02/13/25    Expected End:  04/13/25               Strength       Patient will achieve bilateral ankle plantar flexion strength of 2/5 to improve gait (Progressing)       Start:  02/13/25    Expected End:  04/13/25            Patient will achieve bilateral ankle eversion and inversion strength of 3+/5 to improve gait (Progressing)       Start:  02/13/25    Expected End:  04/13/25                Mac Laird, PT, DPT

## 2025-04-09 ENCOUNTER — PATIENT MESSAGE (OUTPATIENT)
Dept: PODIATRY | Facility: CLINIC | Age: 65
End: 2025-04-09
Payer: COMMERCIAL

## 2025-04-09 ENCOUNTER — TELEPHONE (OUTPATIENT)
Dept: PODIATRY | Facility: CLINIC | Age: 65
End: 2025-04-09
Payer: COMMERCIAL

## 2025-04-09 DIAGNOSIS — G89.29 CHRONIC PAIN OF BOTH FEET: ICD-10-CM

## 2025-04-09 DIAGNOSIS — M79.672 CHRONIC PAIN OF BOTH FEET: ICD-10-CM

## 2025-04-09 DIAGNOSIS — M79.671 CHRONIC PAIN OF BOTH FEET: ICD-10-CM

## 2025-04-09 RX ORDER — DEXAMETHASONE 4 MG/1
4 TABLET ORAL DAILY
Qty: 14 TABLET | Refills: 0 | Status: SHIPPED | OUTPATIENT
Start: 2025-04-09

## 2025-04-09 NOTE — TELEPHONE ENCOUNTER
----- Message from Med Assistant Radford sent at 4/9/2025 12:08 PM CDT -----  Regarding: FW: Refill  Contact: [phone]  Medication request  ----- Message -----  From: Brenda May  Sent: 4/9/2025  11:37 AM CDT  To: Clara [facility]QUEZ Staff  Subject: Refill                                           Type:  RX Refill RequestWho Called: .Inessa LindoRefill or New Rx:Refill RX Name and Strength:dexAMETHasone (DECADRON) 4 MG TabPreferred Pharmacy with phone number:[facility]/pharmacy #[id number] [place], [place] - [address] [address]Oqfpn: [phone] Fax: [phone] or Mail Order:local Ordering Provider:Sarah the patient rather a call back or a response via [facility]? Call back Best Call Back Number:[phone] Information:

## 2025-04-14 ENCOUNTER — CLINICAL SUPPORT (OUTPATIENT)
Dept: REHABILITATION | Facility: HOSPITAL | Age: 65
End: 2025-04-14
Payer: COMMERCIAL

## 2025-04-14 DIAGNOSIS — R29.898 WEAKNESS OF BOTH LOWER EXTREMITIES: Primary | ICD-10-CM

## 2025-04-14 PROCEDURE — 97110 THERAPEUTIC EXERCISES: CPT

## 2025-04-14 PROCEDURE — 97530 THERAPEUTIC ACTIVITIES: CPT

## 2025-04-14 PROCEDURE — 97112 NEUROMUSCULAR REEDUCATION: CPT

## 2025-04-14 NOTE — PROGRESS NOTES
Outpatient Rehab    Physical Therapy Visit / Updated plan of care     Patient Name: Inessa Lindo  MRN: [id number]  YOB: 1960  Today's Date: 4/14/2025    Therapy Diagnosis:   Encounter Diagnosis   Name Primary?    Weakness of both lower extremities Yes         Physician: Lalo Elizabeth, RADHA    Physician Orders: Eval and Treat  Medical Diagnosis: Weakness of both legs [R29.898], Chronic pain of both feet [M79.671, G89.29, M79.672], Gait abnormality [R26.9]        Time In: 0800   Time Out: 0900  Total Time: 60   Total Billable Time: 60 Minutes    FOTO:  Intake Score:  %  Survey Score 1:  %  Survey Score 2:  %         Subjective   Patient continues to note improvements with walking since starting therapy. Patient is going to his doctor this week.  Pain reported as 0/10.      Objective            Lumbar Range of Motion    Active (deg) Passive (deg) Pain   Flexion  (WNL)       Extension  (WNL)       Right Lateral Flexion  (WNL)       Right Rotation  (WNL)       Left Lateral Flexion  (WNL)       Left Rotation  (WNL)          Ankle/Foot Strength - Planes of Motion    Right Strength Right Pain Left Strength Left  Pain   Dorsiflexion (L4) 4   4     Plantar Flexion (S1) 2-   2-     Inversion 4-   4-     Eversion 4-   4-     Great Toe Flexion 1   1     Great Toe Extension (L5) 3-   3-     Lesser Toes Flexion           Lesser Toes Extension                    Four Stage Balance Test  Narrow Base of Support: 10 sec  Tandem Stand - Right Foot in Front: 10 sec  Tandem Stand - Left Foot in Front: 10 sec  Semi-Tandem Stand - Right Foot in Front: 10 sec  Semi-Tandem Stand - Left Foot in Front: 10 sec  Single Leg Stand - Right Foot: 2 sec  Single Leg Stand - Left Foot: 2 sec     L/E Strength w/ MicroFET Muscle Ana Dynamometer Right Left Pain/Dysfunction with Movement   (approx 4 sec hold w/ max contraction)   Hip Flexion 32.7 kg  30.3 kg      Hip Abduction 14 kg  20.1 kg      Quadriceps 53 kg  51 kg     "  Hamstrings 18 kg  20.1 kg           Treatment:    THERAPEUTIC EXERCISES to develop strength, endurance, ROM, and flexibility for 15 minutes including     SLR 3x10 BLE  Bridges c/ BlueTband 3x10  Sidelying clams GTB 3x10    Objectives    NEUROMUSCULAR RE-EDUCATION ACTIVITIES to improve Balance, Coordination, Kinesthetic, Sense, Proprioception, and Motor Control for 22 minutes.  The following were included:      Anterior tib push ups @ wall 3x10  pallof press DBL BTB 2x10  Single leg Balance   Balance   - feet together 30s x 5 on foam  - tandem 30s x 3 ea way on ground   Single leg balance with cable assist 7# both hands  - 30s x 3 ea leg       Not today  TrA brace c/ SB isometric 3" 2x10 3 planes  R ankle DF/PF and inv/evrs c/ fitter board 2x10        THERAPEUTIC ACTIVITIES to improve dynamic and functional performance for 23 minutes including       Standing hip abd/ext 8# 3x10 BL  Shuttle double leg press 4c 3x10  Shuttle single leg press 3c 3c10   STS c/ 20lb chest press 2x10  Nu-step 7 min supervised (for reciprocal UE/LE to improve ambulation at work)                   Assessment & Plan   Assessment: Patient was reassessed today and has shown progress in strength and balance since initial eval. Patient is subjectively noting improvements in walking as well. Patient would continue to benifit from PT to address residual weakness and balance deficits.  Evaluation/Treatment Tolerance: Patient tolerated treatment well    Patient will continue to benefit from skilled outpatient physical therapy to address the deficits listed in the problem list box on initial evaluation, provide pt/family education and to maximize pt's level of independence in the home and community environment.     Patient's spiritual, cultural, and educational needs considered and patient agreeable to plan of care and goals.           Plan: Cont to progress functional tasks and balance activities    Goals:   Active       Functional outcome       " Patient stated goal: To get stronger and improve gait  (Progressing)       Start:  02/13/25    Expected End:  06/14/25            Patient will demonstrate independence in home program for support of progression (Met)       Start:  02/13/25    Expected End:  04/13/25    Resolved:  04/14/25            Strength       Patient will improve bilateral LE strength by 10-20% measured by HHD to improve gait  (Met)       Start:  02/13/25    Expected End:  03/13/25    Resolved:  04/14/25         Patient will achieve bilateral strength by 20-30% measured by HHD to improve gait (Progressing)       Start:  02/13/25    Expected End:  06/14/25               Strength       Patient will achieve bilateral ankle plantar flexion strength of 2/5 to improve gait (Progressing)       Start:  02/13/25    Expected End:  06/14/25            Patient will achieve bilateral ankle eversion and inversion strength of 3+/5 to improve gait (Met)       Start:  02/13/25    Expected End:  04/13/25    Resolved:  04/14/25             Mac Laird, PT, DPT

## 2025-04-15 ENCOUNTER — OFFICE VISIT (OUTPATIENT)
Dept: PODIATRY | Facility: CLINIC | Age: 65
End: 2025-04-15
Payer: COMMERCIAL

## 2025-04-15 VITALS
BODY MASS INDEX: 29 KG/M2 | WEIGHT: 233.25 LBS | HEART RATE: 74 BPM | SYSTOLIC BLOOD PRESSURE: 175 MMHG | HEIGHT: 75 IN | DIASTOLIC BLOOD PRESSURE: 116 MMHG

## 2025-04-15 DIAGNOSIS — R26.9 GAIT ABNORMALITY: ICD-10-CM

## 2025-04-15 DIAGNOSIS — G89.29 CHRONIC PAIN OF BOTH FEET: Primary | ICD-10-CM

## 2025-04-15 DIAGNOSIS — M79.671 CHRONIC PAIN OF BOTH FEET: Primary | ICD-10-CM

## 2025-04-15 DIAGNOSIS — R29.898 WEAKNESS OF BOTH LEGS: ICD-10-CM

## 2025-04-15 DIAGNOSIS — M79.672 CHRONIC PAIN OF BOTH FEET: Primary | ICD-10-CM

## 2025-04-15 PROCEDURE — 1159F MED LIST DOCD IN RCRD: CPT | Mod: CPTII,S$GLB,, | Performed by: STUDENT IN AN ORGANIZED HEALTH CARE EDUCATION/TRAINING PROGRAM

## 2025-04-15 PROCEDURE — 99213 OFFICE O/P EST LOW 20 MIN: CPT | Mod: S$GLB,,, | Performed by: STUDENT IN AN ORGANIZED HEALTH CARE EDUCATION/TRAINING PROGRAM

## 2025-04-15 PROCEDURE — 99999 PR PBB SHADOW E&M-EST. PATIENT-LVL III: CPT | Mod: PBBFAC,,, | Performed by: STUDENT IN AN ORGANIZED HEALTH CARE EDUCATION/TRAINING PROGRAM

## 2025-04-15 PROCEDURE — 3008F BODY MASS INDEX DOCD: CPT | Mod: CPTII,S$GLB,, | Performed by: STUDENT IN AN ORGANIZED HEALTH CARE EDUCATION/TRAINING PROGRAM

## 2025-04-15 PROCEDURE — 1126F AMNT PAIN NOTED NONE PRSNT: CPT | Mod: CPTII,S$GLB,, | Performed by: STUDENT IN AN ORGANIZED HEALTH CARE EDUCATION/TRAINING PROGRAM

## 2025-04-15 PROCEDURE — 3077F SYST BP >= 140 MM HG: CPT | Mod: CPTII,S$GLB,, | Performed by: STUDENT IN AN ORGANIZED HEALTH CARE EDUCATION/TRAINING PROGRAM

## 2025-04-15 PROCEDURE — 3080F DIAST BP >= 90 MM HG: CPT | Mod: CPTII,S$GLB,, | Performed by: STUDENT IN AN ORGANIZED HEALTH CARE EDUCATION/TRAINING PROGRAM

## 2025-04-15 NOTE — PROGRESS NOTES
Subjective:     Patient    Inessa Lindo is a 65 y.o. male.    Problem    02/11/25: Last seen by Dr Ambrose and Dr Walters 10 years ago for right foot pain and unsteadiness. Returns with bilateral diffuse foot pain, ankle numbness, weakness in both legs with difficulty walking. Present for years, worsening. Would like to try inserts but [facility] was too expensive. Attempted PT in the past but only focused directly on the feet and results were limited. Does have some knee pain. Denies back issues.      04/15/25: Returns for bilateral lower limb nerve issues. Has improved significantly with dexamethasone and PT, walking much better and with less pain. Wants some sort of lateral wedges but the custom inserts were too expensive.     History    History obtained from patient and review of medical records.     Past Medical History:   Diagnosis Date    Hypertension        Past Surgical History:   Procedure Laterality Date    KNEE SURGERY          Objective:     Vitals  Wt Readings from Last 1 Encounters:   04/15/25 105.8 kg (233 lb 4 oz)     Temp Readings from Last 1 Encounters:   10/04/23 98.2 °F (36.8 °C) (Oral)     BP Readings from Last 1 Encounters:   04/15/25 (!) 175/116     Pulse Readings from Last 1 Encounters:   04/15/25 74       Dermatological Exam    Skin:  Pedal hair growth, skin color, and skin texture normal on right  Pedal hair growth, skin color, and skin texture normal on left    Nails:  All nails normal in length, thickness, color    Vascular Exam    Arteries:  Posterior tibial artery palpable on right  Dorsalis pedis artery palpable on right  Posterior tibial artery palpable on left  Dorsalis pedis artery palpable on left    Veins:  Superficial veins unremarkable on right  Superficial veins unremarkable on left    Swelling:  None on right  None on left    Neurological Exam    [place] touch test:  6/6 sites sensed, light touch intact    [place] Sign:  + at tibial nerve and sural nerve  bilaterally (improved)    Musculoskeletal Exam    Footwear:  Athletic on right  Athletic on left    Gait Exam:   Ambulatory Status: Ambulatory  Gait: Antalgic, apropulsive, asymmetrical; walks with feet inverted (improved)  Assistive Devices: None    Foot Progression Angle:  Normal on right  Normal on left    Right Lower Extremity Additional Findings:  Pes planus but stands with ankles inverted  Diffuse RLE weakness, 4-/5  Right foot and ankle function, strength, and range of motion unremarkable except as noted above.     Left Lower Extremity Additional Findings:  Pes planus but stands with ankles inverted  Diffuse RLE weakness, 4-/5  Left foot and ankle function, strength, and range of motion unremarkable except as noted above.    Imaging and Other Tests    Imaging:  Independently reviewed and interpreted imaging, findings are as follows: N/A     Assessment:     Encounter Diagnoses   Name Primary?    Chronic pain of both feet Yes    Weakness of both legs     Gait abnormality           Plan:     I counseled the patient on his conditions, their implications and medical management.    Bilateral foot deformity, BLE weakness, gait abnormality, pain, numbness: chronic exacerbated  -Suspect primarily spinal origin for diffuse weakness, sensitivity/pain, numbness.  -Continue PT.   -Dexamethasone PRN.   -Recommended OTC lateral wedges.      Return to clinic  PRN.

## 2025-04-17 ENCOUNTER — OFFICE VISIT (OUTPATIENT)
Dept: FAMILY MEDICINE | Facility: CLINIC | Age: 65
End: 2025-04-17
Payer: COMMERCIAL

## 2025-04-17 ENCOUNTER — LAB VISIT (OUTPATIENT)
Dept: LAB | Facility: HOSPITAL | Age: 65
End: 2025-04-17
Attending: FAMILY MEDICINE
Payer: COMMERCIAL

## 2025-04-17 VITALS
BODY MASS INDEX: 28.82 KG/M2 | HEART RATE: 72 BPM | SYSTOLIC BLOOD PRESSURE: 136 MMHG | TEMPERATURE: 98 F | DIASTOLIC BLOOD PRESSURE: 88 MMHG | OXYGEN SATURATION: 98 % | WEIGHT: 231.81 LBS | HEIGHT: 75 IN | RESPIRATION RATE: 20 BRPM

## 2025-04-17 DIAGNOSIS — Z12.11 COLON CANCER SCREENING: ICD-10-CM

## 2025-04-17 DIAGNOSIS — Z23 NEED FOR TDAP VACCINATION: ICD-10-CM

## 2025-04-17 DIAGNOSIS — E78.2 MIXED HYPERLIPIDEMIA: ICD-10-CM

## 2025-04-17 DIAGNOSIS — I10 PRIMARY HYPERTENSION: ICD-10-CM

## 2025-04-17 DIAGNOSIS — E66.3 OVERWEIGHT (BMI 25.0-29.9): ICD-10-CM

## 2025-04-17 DIAGNOSIS — Z00.00 ANNUAL PHYSICAL EXAM: Primary | ICD-10-CM

## 2025-04-17 DIAGNOSIS — N52.8 OTHER MALE ERECTILE DYSFUNCTION: ICD-10-CM

## 2025-04-17 DIAGNOSIS — Z00.00 ANNUAL PHYSICAL EXAM: ICD-10-CM

## 2025-04-17 DIAGNOSIS — R73.09 ABNORMAL GLUCOSE: ICD-10-CM

## 2025-04-17 LAB
ALBUMIN SERPL BCP-MCNC: 3.9 G/DL (ref 3.5–5.2)
ALP SERPL-CCNC: 54 UNIT/L (ref 40–150)
ALT SERPL W/O P-5'-P-CCNC: 25 UNIT/L (ref 10–44)
ANION GAP (OHS): 9 MMOL/L (ref 8–16)
AST SERPL-CCNC: 26 UNIT/L (ref 11–45)
BILIRUB SERPL-MCNC: 0.6 MG/DL (ref 0.1–1)
BUN SERPL-MCNC: 23 MG/DL (ref 8–23)
CALCIUM SERPL-MCNC: 9.4 MG/DL (ref 8.7–10.5)
CHLORIDE SERPL-SCNC: 105 MMOL/L (ref 95–110)
CHOLEST SERPL-MCNC: 218 MG/DL (ref 120–199)
CHOLEST/HDLC SERPL: 4.4 {RATIO} (ref 2–5)
CO2 SERPL-SCNC: 23 MMOL/L (ref 23–29)
CREAT SERPL-MCNC: 1 MG/DL (ref 0.5–1.4)
EAG (OHS): 117 MG/DL (ref 68–131)
ERYTHROCYTE [DISTWIDTH] IN BLOOD BY AUTOMATED COUNT: 12.8 % (ref 11.5–14.5)
GFR SERPLBLD CREATININE-BSD FMLA CKD-EPI: >60 ML/MIN/1.73/M2
GLUCOSE SERPL-MCNC: 108 MG/DL (ref 70–110)
HBA1C MFR BLD: 5.7 % (ref 4–5.6)
HCT VFR BLD AUTO: 44.6 % (ref 40–54)
HDLC SERPL-MCNC: 49 MG/DL (ref 40–75)
HDLC SERPL: 22.5 % (ref 20–50)
HGB BLD-MCNC: 14.9 GM/DL (ref 14–18)
LDLC SERPL CALC-MCNC: 148.4 MG/DL (ref 63–159)
MCH RBC QN AUTO: 28 PG (ref 27–31)
MCHC RBC AUTO-ENTMCNC: 33.4 G/DL (ref 32–36)
MCV RBC AUTO: 84 FL (ref 82–98)
NONHDLC SERPL-MCNC: 169 MG/DL
PLATELET # BLD AUTO: 218 K/UL (ref 150–450)
PMV BLD AUTO: 9.5 FL (ref 9.2–12.9)
POTASSIUM SERPL-SCNC: 3.7 MMOL/L (ref 3.5–5.1)
PROT SERPL-MCNC: 7.5 GM/DL (ref 6–8.4)
PSA SERPL-MCNC: 0.77 NG/ML
RBC # BLD AUTO: 5.32 M/UL (ref 4.6–6.2)
SODIUM SERPL-SCNC: 137 MMOL/L (ref 136–145)
TRIGL SERPL-MCNC: 103 MG/DL (ref 30–150)
WBC # BLD AUTO: 7.44 K/UL (ref 3.9–12.7)

## 2025-04-17 PROCEDURE — 83036 HEMOGLOBIN GLYCOSYLATED A1C: CPT

## 2025-04-17 PROCEDURE — 84153 ASSAY OF PSA TOTAL: CPT

## 2025-04-17 PROCEDURE — 82040 ASSAY OF SERUM ALBUMIN: CPT

## 2025-04-17 PROCEDURE — 85027 COMPLETE CBC AUTOMATED: CPT

## 2025-04-17 PROCEDURE — 36415 COLL VENOUS BLD VENIPUNCTURE: CPT | Mod: PO

## 2025-04-17 PROCEDURE — 82465 ASSAY BLD/SERUM CHOLESTEROL: CPT

## 2025-04-17 PROCEDURE — 99999 PR PBB SHADOW E&M-EST. PATIENT-LVL V: CPT | Mod: PBBFAC,,, | Performed by: FAMILY MEDICINE

## 2025-04-17 RX ORDER — ATORVASTATIN CALCIUM 20 MG/1
20 TABLET, FILM COATED ORAL NIGHTLY
Qty: 90 TABLET | Refills: 3 | Status: SHIPPED | OUTPATIENT
Start: 2025-04-17

## 2025-04-17 RX ORDER — AMLODIPINE BESYLATE 5 MG/1
5 TABLET ORAL DAILY
Qty: 90 TABLET | Refills: 3 | Status: SHIPPED | OUTPATIENT
Start: 2025-04-17

## 2025-04-17 NOTE — PROGRESS NOTES
"  Physical Exam  /88   Pulse 72   Temp 98 °F (36.7 °C) (Oral)   Resp 20   Ht 6' 3" (1.905 m)   Wt 105.2 kg (231 lb 13 oz)   SpO2 98%   BMI 28.97 kg/m²      Office Visit    Patient Name: Inessa Lindo    : 1960  MRN: [id number]      Assessment/Plan:  Inessa Lindo is a 65 y.o. male who presents today for :    -Annual physical exam  -     Hemoglobin A1C; Future; Expected date: 2025  -     CBC Without Differential; Future; Expected date: 2025  -     Comprehensive Metabolic Panel; Future; Expected date: 2025  -     Lipid Panel; Future; Expected date: 2025  -     Prostate Specific Antigen, Diagnostic; Future; Expected date: 2025  Need for Tdap vaccination  -     Tdap (BOOSTRIX) vaccine injection 0.5 mL  Colon cancer screening  -     Ambulatory referral/consult to Endo Procedure ; Future; Expected date: 2025  -anticipatory guidance provided with age appropriate preventative services discussed  -continue healthy diet with active lifestyle/regular physical exercise    -HTN  -     amLODIPine (NORVASC) 5 MG tablet; Take 1 tablet (5 mg total) by mouth once daily. Followup   -HLD  -     atorvastatin (LIPITOR) 20 MG tablet; Take 1 tablet (20 mg total) by mouth every evening.   -Overweight (BMI 25.0-29.9)  -continue current medication regimen  -DASH diet, regular cardiovascular exercises  -weight loss    -Hx Male erectile dysfunction  -stable        Follow up as needed      This note was created by combination of typed  and MModal dictation.  Transcription errors may be present.  If there are any questions, please contact me.        ----------------------------------------------------------------------------------------------------------------------      HPI:  Patient Care Team:  Mark Petit MD as PCP - General (Family Medicine)    [place] is a 65 y.o. male with    Problem List[1]    [place] presents today for:  Annual Exam        His last Annual " "checkup with me was over a year ago. He is doing well and has no major complaints today. Health maintenance-wise, patient is due for routine labs as well as colon cancer screening. He reports that BP is well controlled at home - no concerns with Amlodipine. He denies any cardiovascular or neurologic complaints today        Wt Readings from Last 4 Encounters:   04/17/25 105.2 kg (231 lb 13 oz)   04/15/25 105.8 kg (233 lb 4 oz)   02/11/25 102.1 kg (225 lb 1.4 oz)   01/09/24 103.9 kg (229 lb 0.9 oz)         Answers submitted by the patient for this visit:  Review of Systems Questionnaire (Submitted on 4/10/2025)  activity change: No  unexpected weight change: No  neck pain: No  hearing loss: No  rhinorrhea: No  trouble swallowing: No  eye discharge: No  visual disturbance: No  chest tightness: No  wheezing: No  chest pain: No  palpitations: No  blood in stool: No  constipation: No  vomiting: No  diarrhea: No  polydipsia: No  polyuria: No  difficulty urinating: No  urgency: No  hematuria: No  joint swelling: No  arthralgias: No  headaches: No  weakness: No  confusion: No  dysphoric mood: No          Current Medications  Medications reviewed and updated.     Current Medications[2]    Past Surgical History:   Procedure Laterality Date    KNEE SURGERY         Family History   Problem Relation Name Age of Onset    Amblyopia Neg Hx      Blindness Neg Hx      Cancer Neg Hx      Cataracts Neg Hx      Diabetes Neg Hx      Glaucoma Neg Hx      Hypertension Neg Hx      Macular degeneration Neg Hx      Retinal detachment Neg Hx      Strabismus Neg Hx      Stroke Neg Hx      Thyroid disease Neg Hx         Social History[3]        Allergies   Review of patient's allergies indicates:  No Known Allergies          Review of Systems  See HPI      [unfilled]  /88   Pulse 72   Temp 98 °F (36.7 °C) (Oral)   Resp 20   Ht 6' 3" (1.905 m)   Wt 105.2 kg (231 lb 13 oz)   SpO2 98%   BMI 28.97 kg/m²     GEN: NAD, well developed, " pleasant, well nourished  HEENT: NCAT, PERRLA, EOMI, sclera clear, anicteric, bilateral ear exam wnl, O/P clear, MMM with no lesions  NECK: normal, supple with midline trachea, no LAD, no thyromegaly  LUNGS: CTAB, no w/r/r, no increased work of breathing   HEART: RRR, normal S1 and S2, no m/r/g, no edema  ABD: s/nt/nd, NABS  SKIN: normal turgor, no rashes  PSYCH: AOx3, appropriate mood and affect  MSK: warm/well perfused, normal ROM in all extremities, no c/c/e.  NEURO: normal without focal findings, CN II-XII are grossly intact.  Sensation/strength grossly normal, gait and station normal.                  [1]   Patient Active Problem List  Diagnosis    Finger pain    Muscle cramping    Gait abnormality    Leg weakness    Decreased range of motion of finger    Finger stiffness, left    -Equinus deformity of foot    -Peroneal tendinitis, right    -Male erectile dysfunction - controlled on Viagra    Weakness of both lower extremities    -HTN    -HLD    -Overweight (BMI 25.0-29.9)   [2]   Current Outpatient Medications:     amLODIPine (NORVASC) 5 MG tablet, Take 1 tablet (5 mg total) by mouth once daily. Followup Dr.T Petit MAR 2026 for more refills, call to schedule/get labs 1wk before doctor's appointment (ordered)., Disp: 90 tablet, Rfl: 3    atorvastatin (LIPITOR) 20 MG tablet, Take 1 tablet (20 mg total) by mouth every evening. Followup Dr.T Petit MAR 2026 for more refills, call to schedule/get labs 1wk before doctor's appointment (ordered)., Disp: 90 tablet, Rfl: 3    chlorhexidine (PERIDEX) 0.12 % solution, PLEASE SEE ATTACHED FOR DETAILED DIRECTIONS, Disp: , Rfl:   No current facility-administered medications for this visit.  [3]   Social History  Socioeconomic History    Marital status:    Tobacco Use    Smoking status: Never    Smokeless tobacco: Never   Substance and Sexual Activity    Alcohol use: No

## 2025-04-17 NOTE — H&P (VIEW-ONLY)
"  Physical Exam  /88   Pulse 72   Temp 98 °F (36.7 °C) (Oral)   Resp 20   Ht 6' 3" (1.905 m)   Wt 105.2 kg (231 lb 13 oz)   SpO2 98%   BMI 28.97 kg/m²      Office Visit    Patient Name: Inessa Lindo    : 1960  MRN: [id number]      Assessment/Plan:  Inessa Lindo is a 65 y.o. male who presents today for :    -Annual physical exam  -     Hemoglobin A1C; Future; Expected date: 2025  -     CBC Without Differential; Future; Expected date: 2025  -     Comprehensive Metabolic Panel; Future; Expected date: 2025  -     Lipid Panel; Future; Expected date: 2025  -     Prostate Specific Antigen, Diagnostic; Future; Expected date: 2025  Need for Tdap vaccination  -     Tdap (BOOSTRIX) vaccine injection 0.5 mL  Colon cancer screening  -     Ambulatory referral/consult to Endo Procedure ; Future; Expected date: 2025  -anticipatory guidance provided with age appropriate preventative services discussed  -continue healthy diet with active lifestyle/regular physical exercise    -HTN  -     amLODIPine (NORVASC) 5 MG tablet; Take 1 tablet (5 mg total) by mouth once daily. Followup   -HLD  -     atorvastatin (LIPITOR) 20 MG tablet; Take 1 tablet (20 mg total) by mouth every evening.   -Overweight (BMI 25.0-29.9)  -continue current medication regimen  -DASH diet, regular cardiovascular exercises  -weight loss    -Hx Male erectile dysfunction  -stable        Follow up as needed      This note was created by combination of typed  and MModal dictation.  Transcription errors may be present.  If there are any questions, please contact me.        ----------------------------------------------------------------------------------------------------------------------      HPI:  Patient Care Team:  Mark Petit MD as PCP - General (Family Medicine)    [place] is a 65 y.o. male with    Problem List[1]    [place] presents today for:  Annual Exam        His last Annual " "checkup with me was over a year ago. He is doing well and has no major complaints today. Health maintenance-wise, patient is due for routine labs as well as colon cancer screening. He reports that BP is well controlled at home - no concerns with Amlodipine. He denies any cardiovascular or neurologic complaints today        Wt Readings from Last 4 Encounters:   04/17/25 105.2 kg (231 lb 13 oz)   04/15/25 105.8 kg (233 lb 4 oz)   02/11/25 102.1 kg (225 lb 1.4 oz)   01/09/24 103.9 kg (229 lb 0.9 oz)         Answers submitted by the patient for this visit:  Review of Systems Questionnaire (Submitted on 4/10/2025)  activity change: No  unexpected weight change: No  neck pain: No  hearing loss: No  rhinorrhea: No  trouble swallowing: No  eye discharge: No  visual disturbance: No  chest tightness: No  wheezing: No  chest pain: No  palpitations: No  blood in stool: No  constipation: No  vomiting: No  diarrhea: No  polydipsia: No  polyuria: No  difficulty urinating: No  urgency: No  hematuria: No  joint swelling: No  arthralgias: No  headaches: No  weakness: No  confusion: No  dysphoric mood: No          Current Medications  Medications reviewed and updated.     Current Medications[2]    Past Surgical History:   Procedure Laterality Date    KNEE SURGERY         Family History   Problem Relation Name Age of Onset    Amblyopia Neg Hx      Blindness Neg Hx      Cancer Neg Hx      Cataracts Neg Hx      Diabetes Neg Hx      Glaucoma Neg Hx      Hypertension Neg Hx      Macular degeneration Neg Hx      Retinal detachment Neg Hx      Strabismus Neg Hx      Stroke Neg Hx      Thyroid disease Neg Hx         Social History[3]        Allergies   Review of patient's allergies indicates:  No Known Allergies          Review of Systems  See HPI      [unfilled]  /88   Pulse 72   Temp 98 °F (36.7 °C) (Oral)   Resp 20   Ht 6' 3" (1.905 m)   Wt 105.2 kg (231 lb 13 oz)   SpO2 98%   BMI 28.97 kg/m²     GEN: NAD, well developed, " pleasant, well nourished  HEENT: NCAT, PERRLA, EOMI, sclera clear, anicteric, bilateral ear exam wnl, O/P clear, MMM with no lesions  NECK: normal, supple with midline trachea, no LAD, no thyromegaly  LUNGS: CTAB, no w/r/r, no increased work of breathing   HEART: RRR, normal S1 and S2, no m/r/g, no edema  ABD: s/nt/nd, NABS  SKIN: normal turgor, no rashes  PSYCH: AOx3, appropriate mood and affect  MSK: warm/well perfused, normal ROM in all extremities, no c/c/e.  NEURO: normal without focal findings, CN II-XII are grossly intact.  Sensation/strength grossly normal, gait and station normal.                  [1]   Patient Active Problem List  Diagnosis    Finger pain    Muscle cramping    Gait abnormality    Leg weakness    Decreased range of motion of finger    Finger stiffness, left    -Equinus deformity of foot    -Peroneal tendinitis, right    -Male erectile dysfunction - controlled on Viagra    Weakness of both lower extremities    -HTN    -HLD    -Overweight (BMI 25.0-29.9)   [2]   Current Outpatient Medications:     amLODIPine (NORVASC) 5 MG tablet, Take 1 tablet (5 mg total) by mouth once daily. Followup Dr.T Petit MAR 2026 for more refills, call to schedule/get labs 1wk before doctor's appointment (ordered)., Disp: 90 tablet, Rfl: 3    atorvastatin (LIPITOR) 20 MG tablet, Take 1 tablet (20 mg total) by mouth every evening. Followup Dr.T Petit MAR 2026 for more refills, call to schedule/get labs 1wk before doctor's appointment (ordered)., Disp: 90 tablet, Rfl: 3    chlorhexidine (PERIDEX) 0.12 % solution, PLEASE SEE ATTACHED FOR DETAILED DIRECTIONS, Disp: , Rfl:   No current facility-administered medications for this visit.  [3]   Social History  Socioeconomic History    Marital status:    Tobacco Use    Smoking status: Never    Smokeless tobacco: Never   Substance and Sexual Activity    Alcohol use: No

## 2025-04-21 ENCOUNTER — TELEPHONE (OUTPATIENT)
Dept: ENDOSCOPY | Facility: HOSPITAL | Age: 65
End: 2025-04-21

## 2025-04-22 ENCOUNTER — TELEPHONE (OUTPATIENT)
Dept: ENDOSCOPY | Facility: HOSPITAL | Age: 65
End: 2025-04-22
Payer: COMMERCIAL

## 2025-04-22 VITALS — BODY MASS INDEX: 28.72 KG/M2 | WEIGHT: 231 LBS | HEIGHT: 75 IN

## 2025-04-22 DIAGNOSIS — Z12.11 COLON CANCER SCREENING: Primary | ICD-10-CM

## 2025-04-22 RX ORDER — SODIUM, POTASSIUM,MAG SULFATES 17.5-3.13G
1 SOLUTION, RECONSTITUTED, ORAL ORAL DAILY
Qty: 1 KIT | Refills: 0 | Status: SHIPPED | OUTPATIENT
Start: 2025-04-22 | End: 2025-04-24

## 2025-04-22 NOTE — TELEPHONE ENCOUNTER
Referral for procedure from  [facility] referral (see Appts tab)      Spoke to pt to schedule procedure(s) Colonoscopy       Physician to perform procedure(s) Dr. QUINCY Schwarz  Date of Procedure (s) 5/17/25  Arrival Time 10:00 AM  Time of Procedure(s) 11:00 AM   Location of Procedure(s) [facility] [place] [facility]   Type of Rx Prep sent to patient: Suprep  Instructions provided to patient via Postal Mail    Patient was informed on the following information and verbalized understanding. Screening questionnaire reviewed with patient and complete. If procedure requires anesthesia, a responsible adult needs to be present to accompany the patient home, patient cannot drive after receiving anesthesia. Appointment details are tentative, especially check-in time. Patient will receive a prep-op call 7 days prior to confirm check-in time for procedure. If applicable the patient should contact their pharmacy to verify Rx for procedure prep is ready for pick-up. Patient was advised to call the scheduling department at [phone] if pharmacy states no Rx is available. Patient was advised to call the endoscopy scheduling department if any questions or concerns arise.      [facility] Scheduling Department

## 2025-04-29 ENCOUNTER — RESULTS FOLLOW-UP (OUTPATIENT)
Dept: FAMILY MEDICINE | Facility: CLINIC | Age: 65
End: 2025-04-29

## 2025-05-13 ENCOUNTER — TELEPHONE (OUTPATIENT)
Dept: ENDOSCOPY | Facility: HOSPITAL | Age: 65
End: 2025-05-13
Payer: COMMERCIAL

## 2025-05-13 NOTE — TELEPHONE ENCOUNTER
Yanci Vidales Terri, RN  Caller: Unspecified (Today,  8:06 AM)         Previous Messages       ----- Message -----  From: Violet Zurita  Sent: 5/13/2025   8:12 AM CDT  To: [facility] Endoscopy Schedulers    Type:  Needs Medical Advice    Who Called: Mr Lindo  Would the patient rather a call back or a response via [employer]? Call  Best Call Back Number: [phone]  Additional Information: he has a procedure schedule for May 17 but, states he needs to know his arrival celine and instructions please call

## 2025-05-13 NOTE — PROGRESS NOTES
Spoke to patient regarding arrival time, parking and registration location, ride verification, NPO status, prep instructions, no GLP-1, no anti-coagulants use. Patient verbalizes understanding.

## 2025-05-17 ENCOUNTER — ANESTHESIA (OUTPATIENT)
Dept: ENDOSCOPY | Facility: HOSPITAL | Age: 65
End: 2025-05-17
Payer: COMMERCIAL

## 2025-05-17 ENCOUNTER — HOSPITAL ENCOUNTER (OUTPATIENT)
Facility: HOSPITAL | Age: 65
Discharge: HOME | End: 2025-05-17
Attending: INTERNAL MEDICINE | Admitting: INTERNAL MEDICINE
Payer: COMMERCIAL

## 2025-05-17 ENCOUNTER — ANESTHESIA EVENT (OUTPATIENT)
Dept: ENDOSCOPY | Facility: HOSPITAL | Age: 65
End: 2025-05-17
Payer: COMMERCIAL

## 2025-05-17 VITALS
TEMPERATURE: 98 F | SYSTOLIC BLOOD PRESSURE: 124 MMHG | RESPIRATION RATE: 15 BRPM | HEART RATE: 71 BPM | DIASTOLIC BLOOD PRESSURE: 85 MMHG | OXYGEN SATURATION: 98 %

## 2025-05-17 DIAGNOSIS — Z12.11 SPECIAL SCREENING FOR MALIGNANT NEOPLASMS, COLON: Primary | ICD-10-CM

## 2025-05-17 DIAGNOSIS — Z12.11 COLON CANCER SCREENING: ICD-10-CM

## 2025-05-17 PROCEDURE — 45385 COLONOSCOPY W/LESION REMOVAL: CPT | Mod: 33 | Performed by: INTERNAL MEDICINE

## 2025-05-17 PROCEDURE — 63600175 PHARM REV CODE 636 W HCPCS

## 2025-05-17 PROCEDURE — 37000008 HC ANESTHESIA 1ST 15 MINUTES: Performed by: INTERNAL MEDICINE

## 2025-05-17 PROCEDURE — 25000003 PHARM REV CODE 250

## 2025-05-17 PROCEDURE — 45385 COLONOSCOPY W/LESION REMOVAL: CPT | Mod: 33,,, | Performed by: INTERNAL MEDICINE

## 2025-05-17 PROCEDURE — 37000009 HC ANESTHESIA EA ADD 15 MINS: Performed by: INTERNAL MEDICINE

## 2025-05-17 PROCEDURE — 88305 TISSUE EXAM BY PATHOLOGIST: CPT | Mod: TC | Performed by: INTERNAL MEDICINE

## 2025-05-17 PROCEDURE — 27201089 HC SNARE, DISP (ANY): Performed by: INTERNAL MEDICINE

## 2025-05-17 RX ORDER — PROPOFOL 10 MG/ML
VIAL (ML) INTRAVENOUS
Status: DISCONTINUED | OUTPATIENT
Start: 2025-05-17 | End: 2025-05-17

## 2025-05-17 RX ORDER — LIDOCAINE HYDROCHLORIDE 20 MG/ML
INJECTION, SOLUTION EPIDURAL; INFILTRATION; INTRACAUDAL; PERINEURAL
Status: DISCONTINUED
Start: 2025-05-17 | End: 2025-05-17 | Stop reason: HOSPADM

## 2025-05-17 RX ORDER — LIDOCAINE HYDROCHLORIDE 20 MG/ML
INJECTION INTRAVENOUS
Status: DISCONTINUED | OUTPATIENT
Start: 2025-05-17 | End: 2025-05-17

## 2025-05-17 RX ORDER — PROPOFOL 10 MG/ML
VIAL (ML) INTRAVENOUS
Status: DISCONTINUED
Start: 2025-05-17 | End: 2025-05-17 | Stop reason: HOSPADM

## 2025-05-17 RX ADMIN — LIDOCAINE HYDROCHLORIDE 100 MG: 20 INJECTION, SOLUTION INTRAVENOUS at 11:05

## 2025-05-17 RX ADMIN — PROPOFOL 20 MG: 10 INJECTION, EMULSION INTRAVENOUS at 11:05

## 2025-05-17 RX ADMIN — PROPOFOL 40 MG: 10 INJECTION, EMULSION INTRAVENOUS at 11:05

## 2025-05-17 RX ADMIN — SODIUM CHLORIDE: 0.9 INJECTION, SOLUTION INTRAVENOUS at 11:05

## 2025-05-17 RX ADMIN — PROPOFOL 100 MG: 10 INJECTION, EMULSION INTRAVENOUS at 11:05

## 2025-05-17 NOTE — TRANSFER OF CARE
Anesthesia Transfer of Care Note    Patient: Inessa Lindo    Procedure(s) Performed: Procedure(s) (LRB):  COLONOSCOPY, SCREENING, LOW RISK PATIENT (N/A)    Patient location: GI    Anesthesia Type: general    Transport from OR: Transported from OR on room air with adequate spontaneous ventilation    Post pain: adequate analgesia    Post assessment: no apparent anesthetic complications and tolerated procedure well    Post vital signs: stable    Level of consciousness: responds to stimulation and lethargic    Nausea/Vomiting: no nausea/vomiting    Complications: none    Transfer of care protocol was followed      Last vitals: Visit Vitals  BP (!) 105/58 (BP Location: Left arm, Patient Position: Lying)   Pulse 81   Temp 36.5 °C (97.7 °F) (Oral)   Resp 12   SpO2 98%

## 2025-05-17 NOTE — ANESTHESIA POSTPROCEDURE EVALUATION
Anesthesia Post Evaluation    Patient: Inessa Lindo    Procedure(s) Performed: Procedure(s) (LRB):  COLONOSCOPY, SCREENING, LOW RISK PATIENT (N/A)    Final Anesthesia Type: general      Patient location during evaluation: GI PACU  Patient participation: Yes- Able to Participate  Level of consciousness: awake and alert, oriented and awake  Post-procedure vital signs: reviewed and stable  Airway patency: patent    PONV status at discharge: No PONV  Anesthetic complications: no      Cardiovascular status: blood pressure returned to baseline  Respiratory status: unassisted, spontaneous ventilation and room air  Hydration status: euvolemic  Follow-up not needed.              Vitals Value Taken Time   /85 05/17/25 11:50   Temp 36.5 °C (97.7 °F) 05/17/25 11:26   Pulse 71 05/17/25 11:50   Resp 15 05/17/25 11:50   SpO2 98 % 05/17/25 11:50         Event Time   Out of Recovery 11:57:16         Pain/Jarett Score: Jarett Score: 10 (5/17/2025 11:39 AM)

## 2025-05-17 NOTE — ANESTHESIA PREPROCEDURE EVALUATION
05/17/2025  Inessa Lindo is a 65 y.o., male.      Pre-op Assessment    I have reviewed the Patient Summary Reports.     I have reviewed the Nursing Notes.    I have reviewed the Medications.     Review of Systems  Anesthesia Hx:  No problems with previous Anesthesia   Neg history of prior surgery.          Denies Family Hx of Anesthesia complications.    Denies Personal Hx of Anesthesia complications.                    Social:  Non-Smoker, No Alcohol Use       Hematology/Oncology:  Hematology Normal   Oncology Normal                                   EENT/Dental:  EENT/Dental Normal           Cardiovascular:  Exercise tolerance: good   Hypertension                                          Pulmonary:  Pulmonary Normal                       Renal/:  Renal/ Normal                 Hepatic/GI:  Hepatic/GI Normal                    Musculoskeletal:  Musculoskeletal Normal                Neurological:  Neurology Normal                                      Endocrine:  Endocrine Normal            Dermatological:  Skin Normal    Psych:  Psychiatric Normal                    Physical Exam  General: Well nourished    Airway:  Mallampati: II   Mouth Opening: Normal  Tongue: Normal  Neck ROM: Normal ROM    Dental:  Intact    Chest/Lungs:  Clear to auscultation    Heart:  Rate: Normal  Rhythm: Regular Rhythm  Sounds: Normal        Anesthesia Plan  Type of Anesthesia, risks & benefits discussed:    Anesthesia Type: Gen Natural Airway  Intra-op Monitoring Plan: Standard ASA Monitors  Induction:  IV  Informed Consent: Informed consent signed with the Patient and all parties understand the risks and agree with anesthesia plan.  All questions answered.   ASA Score: 2    Ready For Surgery From Anesthesia Perspective.     .

## 2025-05-17 NOTE — PLAN OF CARE
Procedure and recovery complete, Awake and alert, VSS, denies complaints of pain, spouse at bedside, Dr. Schwarz discussed procedure findings with patient. Ambulates without assistance, gait steady. Discharge criteria met.

## 2025-05-17 NOTE — PROVATION PATIENT INSTRUCTIONS
Discharge Summary/Instructions after an Endoscopic Procedure  Patient Name: Inessa Lindo  Patient MRN: [id number]  Patient YOB: 1960  Saturday, May 17, 2025  Cezar Schwarz MD  Dear patient,  As a result of recent federal legislation (The Federal Cures Act), you may   receive lab or pathology results from your procedure in your [facility]   account before your physician is able to contact you. Your physician or   their representative will relay the results to you with their   recommendations at their soonest availability.  Thank you,  RESTRICTIONS:  During your procedure today, you received medications for sedation.  These   medications may affect your judgment, balance and coordination.  Therefore,   for 24 hours, you have the following restrictions:   - DO NOT drive a car, operate machinery, make legal/financial decisions,   sign important papers or drink alcohol.    ACTIVITY:  Today: no heavy lifting, straining or running due to procedural   sedation/anesthesia.  The following day: return to full activity including work.  DIET:  Eat and drink normally unless instructed otherwise.     TREATMENT FOR COMMON SIDE EFFECTS:  - Mild abdominal pain, nausea, belching, bloating or excessive gas:  rest,   eat lightly and use a heating pad.  - Sore Throat: treat with throat lozenges and/or gargle with warm salt   water.  - Because air was used during the procedure, expelling large amounts of air   from your rectum or belching is normal.  - If a bowel prep was taken, you may not have a bowel movement for 1-3 days.    This is normal.  SYMPTOMS TO WATCH FOR AND REPORT TO YOUR PHYSICIAN:  1. Abdominal pain or bloating, other than gas cramps.  2. Chest pain.  3. Back pain.  4. Signs of infection such as: chills or fever occurring within 24 hours   after the procedure.  5. Rectal bleeding, which would show as bright red, maroon, or black stools.   (A tablespoon of blood from the rectum is not serious, especially if    hemorrhoids are present.)  6. Vomiting.  7. Weakness or dizziness.  GO DIRECTLY TO THE NEAREST EMERGENCY ROOM IF YOU HAVE ANY OF THE FOLLOWING:      Difficulty breathing              Chills and/or fever over 101 F   Persistent vomiting and/or vomiting blood   Severe abdominal pain   Severe chest pain   Black, tarry stools   Bleeding- more than one tablespoon   Any other symptom or condition that you feel may need urgent attention  Your doctor recommends these additional instructions:  If any biopsies were taken, your doctors clinic will contact you in 1 to 2   weeks with any results.  - Discharge patient to home (ambulatory).   - Patient has a contact number available for emergencies.  The signs and   symptoms of potential delayed complications were discussed with the   patient.  Return to normal activities tomorrow.  Written discharge   instructions were provided to the patient.   - Resume previous diet.   - Continue present medications.   - Return to primary care physician as previously scheduled.   - Repeat colonoscopy in 7 years for surveillance.  For questions, problems or results please call your physician - Cezar Schwarz MD at Work:  [phone].  [facility] can be reached at [phone]     IF A COMPLICATION OR EMERGENCY SITUATION ARISES AND YOU ARE UNABLE TO REACH   YOUR PHYSICIAN - GO DIRECTLY TO THE EMERGENCY ROOM.  Cezar Schwarz MD  5/17/2025 11:21:32 AM  This report has been verified and signed electronically.  Dear patient,  As a result of recent federal legislation (The Federal Cures Act), you may   receive lab or pathology results from your procedure in your [facility]   account before your physician is able to contact you. Your physician or   their representative will relay the results to you with their   recommendations at their soonest availability.  Thank you,  PR[employer]ATION

## 2025-05-22 ENCOUNTER — RESULTS FOLLOW-UP (OUTPATIENT)
Dept: GASTROENTEROLOGY | Facility: CLINIC | Age: 65
End: 2025-05-22

## 2025-05-22 LAB
ESTROGEN SERPL-MCNC: NORMAL PG/ML
INSULIN SERPL-ACNC: NORMAL U[IU]/ML
LAB AP CLINICAL INFORMATION: NORMAL
LAB AP GROSS DESCRIPTION: NORMAL
LAB AP PERFORMING LOCATION(S): NORMAL
LAB AP REPORT FOOTNOTES: NORMAL

## 2025-06-25 ENCOUNTER — TELEPHONE (OUTPATIENT)
Dept: PODIATRY | Facility: CLINIC | Age: 65
End: 2025-06-25
Payer: COMMERCIAL

## 2025-06-25 ENCOUNTER — PATIENT MESSAGE (OUTPATIENT)
Dept: PODIATRY | Facility: CLINIC | Age: 65
End: 2025-06-25
Payer: COMMERCIAL

## 2025-06-25 DIAGNOSIS — G89.29 CHRONIC PAIN OF BOTH FEET: Primary | ICD-10-CM

## 2025-06-25 DIAGNOSIS — M79.672 CHRONIC PAIN OF BOTH FEET: Primary | ICD-10-CM

## 2025-06-25 DIAGNOSIS — M79.671 CHRONIC PAIN OF BOTH FEET: Primary | ICD-10-CM

## 2025-06-25 RX ORDER — DEXAMETHASONE 4 MG/1
4 TABLET ORAL DAILY PRN
Qty: 30 TABLET | Refills: 0 | Status: SHIPPED | OUTPATIENT
Start: 2025-06-25

## 2025-06-25 NOTE — TELEPHONE ENCOUNTER
Copied from CRM #[id number]. Topic: Medications - Medication Refill  >> Jun 25, 2025  8:12 AM Nuha wrote:  Rx Refill/Request     Is this a Refill or New Rx:  refill    Rx Name and Strength:  dexAMETHasone (DECADRON) 4 MG Tab    Preferred Pharmacy with phone number:   [facility]/pharmacy #[id number] [place] [id number] [address]  Phone: [phone] Fax: [phone]    Communication Preference: [phone]    Additional Information:

## 2025-07-23 DIAGNOSIS — G89.29 CHRONIC PAIN OF BOTH FEET: ICD-10-CM

## 2025-07-23 DIAGNOSIS — M79.671 CHRONIC PAIN OF BOTH FEET: ICD-10-CM

## 2025-07-23 DIAGNOSIS — M79.672 CHRONIC PAIN OF BOTH FEET: ICD-10-CM

## 2025-07-24 RX ORDER — DEXAMETHASONE 4 MG/1
TABLET ORAL
Qty: 30 TABLET | Refills: 0 | Status: SHIPPED | OUTPATIENT
Start: 2025-07-24